# Patient Record
Sex: MALE | Race: OTHER | NOT HISPANIC OR LATINO | Employment: OTHER | URBAN - METROPOLITAN AREA
[De-identification: names, ages, dates, MRNs, and addresses within clinical notes are randomized per-mention and may not be internally consistent; named-entity substitution may affect disease eponyms.]

---

## 2022-05-02 ENCOUNTER — OFFICE VISIT (OUTPATIENT)
Dept: UROLOGY | Facility: CLINIC | Age: 67
End: 2022-05-02
Payer: MEDICARE

## 2022-05-02 VITALS
WEIGHT: 221.81 LBS | HEART RATE: 79 BPM | BODY MASS INDEX: 30.04 KG/M2 | HEIGHT: 72 IN | DIASTOLIC BLOOD PRESSURE: 79 MMHG | SYSTOLIC BLOOD PRESSURE: 127 MMHG

## 2022-05-02 DIAGNOSIS — N13.8 BPH WITH URINARY OBSTRUCTION: Primary | ICD-10-CM

## 2022-05-02 DIAGNOSIS — N40.1 BPH WITH URINARY OBSTRUCTION: Primary | ICD-10-CM

## 2022-05-02 PROCEDURE — 3288F FALL RISK ASSESSMENT DOCD: CPT | Mod: CPTII,S$GLB,, | Performed by: UROLOGY

## 2022-05-02 PROCEDURE — 1126F AMNT PAIN NOTED NONE PRSNT: CPT | Mod: CPTII,S$GLB,, | Performed by: UROLOGY

## 2022-05-02 PROCEDURE — 1126F PR PAIN SEVERITY QUANTIFIED, NO PAIN PRESENT: ICD-10-PCS | Mod: CPTII,S$GLB,, | Performed by: UROLOGY

## 2022-05-02 PROCEDURE — 3008F PR BODY MASS INDEX (BMI) DOCUMENTED: ICD-10-PCS | Mod: CPTII,S$GLB,, | Performed by: UROLOGY

## 2022-05-02 PROCEDURE — 3078F PR MOST RECENT DIASTOLIC BLOOD PRESSURE < 80 MM HG: ICD-10-PCS | Mod: CPTII,S$GLB,, | Performed by: UROLOGY

## 2022-05-02 PROCEDURE — 3074F PR MOST RECENT SYSTOLIC BLOOD PRESSURE < 130 MM HG: ICD-10-PCS | Mod: CPTII,S$GLB,, | Performed by: UROLOGY

## 2022-05-02 PROCEDURE — 99999 PR PBB SHADOW E&M-NEW PATIENT-LVL III: ICD-10-PCS | Mod: PBBFAC,,, | Performed by: UROLOGY

## 2022-05-02 PROCEDURE — 99204 OFFICE O/P NEW MOD 45 MIN: CPT | Mod: S$GLB,,, | Performed by: UROLOGY

## 2022-05-02 PROCEDURE — 1101F PT FALLS ASSESS-DOCD LE1/YR: CPT | Mod: CPTII,S$GLB,, | Performed by: UROLOGY

## 2022-05-02 PROCEDURE — 1160F RVW MEDS BY RX/DR IN RCRD: CPT | Mod: CPTII,S$GLB,, | Performed by: UROLOGY

## 2022-05-02 PROCEDURE — 1101F PR PT FALLS ASSESS DOC 0-1 FALLS W/OUT INJ PAST YR: ICD-10-PCS | Mod: CPTII,S$GLB,, | Performed by: UROLOGY

## 2022-05-02 PROCEDURE — 3008F BODY MASS INDEX DOCD: CPT | Mod: CPTII,S$GLB,, | Performed by: UROLOGY

## 2022-05-02 PROCEDURE — 3078F DIAST BP <80 MM HG: CPT | Mod: CPTII,S$GLB,, | Performed by: UROLOGY

## 2022-05-02 PROCEDURE — 99204 PR OFFICE/OUTPT VISIT, NEW, LEVL IV, 45-59 MIN: ICD-10-PCS | Mod: S$GLB,,, | Performed by: UROLOGY

## 2022-05-02 PROCEDURE — 1159F MED LIST DOCD IN RCRD: CPT | Mod: CPTII,S$GLB,, | Performed by: UROLOGY

## 2022-05-02 PROCEDURE — 3074F SYST BP LT 130 MM HG: CPT | Mod: CPTII,S$GLB,, | Performed by: UROLOGY

## 2022-05-02 PROCEDURE — 1159F PR MEDICATION LIST DOCUMENTED IN MEDICAL RECORD: ICD-10-PCS | Mod: CPTII,S$GLB,, | Performed by: UROLOGY

## 2022-05-02 PROCEDURE — 1160F PR REVIEW ALL MEDS BY PRESCRIBER/CLIN PHARMACIST DOCUMENTED: ICD-10-PCS | Mod: CPTII,S$GLB,, | Performed by: UROLOGY

## 2022-05-02 PROCEDURE — 3288F PR FALLS RISK ASSESSMENT DOCUMENTED: ICD-10-PCS | Mod: CPTII,S$GLB,, | Performed by: UROLOGY

## 2022-05-02 PROCEDURE — 99999 PR PBB SHADOW E&M-NEW PATIENT-LVL III: CPT | Mod: PBBFAC,,, | Performed by: UROLOGY

## 2022-05-02 RX ORDER — LIDOCAINE HYDROCHLORIDE 20 MG/ML
JELLY TOPICAL ONCE
Status: CANCELLED | OUTPATIENT
Start: 2022-05-02 | End: 2022-05-02

## 2022-05-02 RX ORDER — ROSUVASTATIN CALCIUM 5 MG/1
5 TABLET, COATED ORAL
COMMUNITY

## 2022-05-02 RX ORDER — MULTIVITAMIN
1 TABLET ORAL DAILY
COMMUNITY

## 2022-05-02 RX ORDER — DOXAZOSIN 8 MG/1
4 TABLET ORAL
COMMUNITY

## 2022-05-02 RX ORDER — TAMSULOSIN HYDROCHLORIDE 0.4 MG/1
1 CAPSULE ORAL DAILY
COMMUNITY
Start: 2021-05-27 | End: 2024-01-23

## 2022-05-02 RX ORDER — BENAZEPRIL HYDROCHLORIDE AND HYDROCHLOROTHIAZIDE 20; 12.5 MG/1; MG/1
0.5 TABLET ORAL
COMMUNITY

## 2022-05-02 NOTE — PROGRESS NOTES
CHIEF COMPLAINT:    Mr. Edmonds is a 66 y.o. male presenting with LUTS.    PRESENTING ILLNESS:    Ahmet Edmonds is a 66 y.o. male who c/o LUTS.  He is friends with Socorro and Abigail.  He has a decreased FOS, + straining, + hesitancy, + feeling of incomplete emptying.  He's on flomax BID. Was on Avodart which improved his symptoms, but he stopped it due to sexual side effects.    He's on TRT managed by a Dr in [place].  He has ED and gets good results with Viagra and Cialis.    REVIEW OF SYSTEMS:    Ahmet Edmonds denies headache, blurred vision, fever, nausea, vomiting, chills, abdominal pain, chest pain, sore throat, bleeding per rectum, cough, SOB, recent loss of consciousness, recent mental status changes, seizures, dizziness, or upper or lower extremity weakness.    LINDA  1. 3  2. 4  3. 4  4. 4  5. 4      PATIENT HISTORY:    Past Medical History:   Diagnosis Date    Hypertension        Past Surgical History:   Procedure Laterality Date    KNEE ARTHROSCOPY Left        Family History   Problem Relation Age of Onset    Heart disease Father     Hypertension Father        Social History     Socioeconomic History    Marital status: Single   Tobacco Use    Smoking status: Never Smoker    Smokeless tobacco: Never Used   Substance and Sexual Activity    Alcohol use: Yes    Drug use: Never    Sexual activity: Yes     Partners: Female       Allergies:  Patient has no known allergies.    Medications:    Current Outpatient Medications:     benazepril-hydrochlorthiazide (LOTENSIN HCT) 20-12.5 mg per tablet, Take 0.5 tablets by mouth., Disp: , Rfl:     doxazosin (CARDURA) 8 MG Tab, Take 4 mg by mouth., Disp: , Rfl:     multivitamin (THERAGRAN) per tablet, Take 1 tablet by mouth once daily., Disp: , Rfl:     rosuvastatin (CRESTOR) 5 MG tablet, Take 5 mg by mouth., Disp: , Rfl:     tamsulosin (FLOMAX) 0.4 mg Cap, Take 1 capsule by mouth once daily., Disp: , Rfl:     PHYSICAL EXAMINATION:    The patient  generally appears in good health, is appropriately interactive, and is in no apparent distress.     Eyes: anicteric sclerae, moist conjunctivae; no lid-lag; PERRLA     HENT: Atraumatic; oropharynx clear with moist mucous membranes and no mucosal ulcerations;normal hard and soft palate.  No evidence of lymphadenopathy.    Neck: Trachea midline.  No thyromegaly.    Skin: No lesions.    Mental: Cooperative with normal affect.  Is oriented to time, place, and person.    Neuro: Grossly intact.    Chest: Normal inspiratory effort.   No accessory muscles.  No audible wheezes.  Respirations symmetric on inspiration and expiration.    Heart: Regular rhythm.      Abdomen:  Soft, non-tender. No masses or organomegaly. Bladder is not palpable. No evidence of flank discomfort. No evidence of inguinal hernia.    Genitourinary: The penis is circumcised with no evidence of plaques or induration. The urethral meatus is normal. The testes, epididymides, and cord structures are normal in size and contour bilaterally. The scrotum is normal in size and contour.    Normal anal sphincter tone. No rectal mass.    The prostate is 50 g. Normal landmarks. Lateral sulci. Median furrow intact.  No nodularity or induration. Seminal vesicles are normal.    Extremities: No clubbing, cyanosis, or edema      LABS:    UA dipped negative today  PVR done immediately after voiding by my nurse is 230 cc.     PSA 12/2021--2.5  No results found for: PSA, PSADIAG, PSATOTAL, PSAFREE, PSAFREEPCT    IMPRESSION:    Encounter Diagnoses   Name Primary?    BPH with urinary obstruction Yes         PLAN:    1. Discussed options for his LUTS.  He'd like rezum.  2. Can continue PCa screening in [place].  3. Continue Viagra or Cialis for ED.  4. Discussed risks/benefits of Rezum.  Discussed bleeding, frequency, failure amongst other risks.  Also discussed very small risk of EjD and ED.  He was given the chance to ask questions.  Alternatives discussed.  Will  schedule for Rezum.  5. Will cysto to make sure he's a candidate.    Copy to:

## 2022-05-04 ENCOUNTER — TELEPHONE (OUTPATIENT)
Dept: UROLOGY | Facility: CLINIC | Age: 67
End: 2022-05-04
Payer: MEDICARE

## 2022-05-04 DIAGNOSIS — N13.8 BPH WITH URINARY OBSTRUCTION: Primary | ICD-10-CM

## 2022-05-04 DIAGNOSIS — N40.1 BPH WITH URINARY OBSTRUCTION: Primary | ICD-10-CM

## 2022-05-06 ENCOUNTER — PROCEDURE VISIT (OUTPATIENT)
Dept: UROLOGY | Facility: CLINIC | Age: 67
End: 2022-05-06
Payer: MEDICARE

## 2022-05-06 VITALS
SYSTOLIC BLOOD PRESSURE: 143 MMHG | HEART RATE: 74 BPM | TEMPERATURE: 99 F | WEIGHT: 226.5 LBS | RESPIRATION RATE: 17 BRPM | HEIGHT: 72 IN | DIASTOLIC BLOOD PRESSURE: 79 MMHG | BODY MASS INDEX: 30.68 KG/M2

## 2022-05-06 DIAGNOSIS — N40.1 BPH WITH URINARY OBSTRUCTION: ICD-10-CM

## 2022-05-06 DIAGNOSIS — N13.8 BPH WITH URINARY OBSTRUCTION: ICD-10-CM

## 2022-05-06 PROCEDURE — 52000 CYSTOURETHROSCOPY: CPT | Mod: S$GLB,,, | Performed by: UROLOGY

## 2022-05-06 PROCEDURE — 52000 PR CYSTOURETHROSCOPY: ICD-10-PCS | Mod: S$GLB,,, | Performed by: UROLOGY

## 2022-05-06 RX ORDER — LIDOCAINE HYDROCHLORIDE 20 MG/ML
JELLY TOPICAL ONCE
Status: COMPLETED | OUTPATIENT
Start: 2022-05-06 | End: 2022-05-06

## 2022-05-06 RX ADMIN — LIDOCAINE HYDROCHLORIDE: 20 JELLY TOPICAL at 11:05

## 2022-05-06 NOTE — PROCEDURES
Procedures   Date: 05/06/2022     Pre procedure diagnosis:  Encounter Diagnosis   Name Primary?    BPH with urinary obstruction         Post procedure diagnosis:same    Surgeon: Cipriano    Assistant: Reggie    Procedure performed: cystoscopy    Blood loss: None    Specimen: None    Procedure in detail: Consent was obtained.  Using standard sterile technique, the flexible cystoscope was assembled and passed into the patient's bladder.  Cystoscopic evaluation of the bladder revealed no abnormalities.  The estimated prostatic length was 3.5 cm with a small median lobe.

## 2022-05-13 ENCOUNTER — TELEPHONE (OUTPATIENT)
Dept: UROLOGY | Facility: CLINIC | Age: 67
End: 2022-05-13
Payer: MEDICARE

## 2022-05-13 NOTE — TELEPHONE ENCOUNTER
Pt requesting call back re: pt developed a fever on Wednesday and it has been on/off since. Pt also states it hurts and burns when he urinates as well and is worried it may be side affects from his procedure on 5-6.   Returned patient's call states his temperature was 100.2 and once it was 100.8. no temp now , just having a little burning with urination, I instructed patient to increase water intake, he screamed  At me and said he will just go to his PCP to see if he have a UTI then he slammed the phone down .

## 2022-05-17 ENCOUNTER — TELEPHONE (OUTPATIENT)
Dept: UROLOGY | Facility: CLINIC | Age: 67
End: 2022-05-17
Payer: MEDICARE

## 2022-05-17 NOTE — TELEPHONE ENCOUNTER
----- Message from Flory Saba LPN sent at 5/16/2022  3:08 PM CDT -----  Contact: pt  June I spoke with this patient he want to cancel his surgery scheduled for the 31st  ----- Message -----  From: Merly Hawkins  Sent: 5/16/2022  11:13 AM CDT  To: Cipriano MILLER Staff    Pt calling in regards to cancelling procedure . Please call       Confirmed patient's contact info below:  Contact Name: Ahmet Edmonds  Phone Number: [phone]

## 2024-01-23 ENCOUNTER — OFFICE VISIT (OUTPATIENT)
Dept: INTERNAL MEDICINE | Facility: CLINIC | Age: 69
End: 2024-01-23
Payer: MEDICARE

## 2024-01-23 VITALS
HEART RATE: 83 BPM | BODY MASS INDEX: 27.41 KG/M2 | HEIGHT: 72 IN | OXYGEN SATURATION: 98 % | WEIGHT: 202.38 LBS | SYSTOLIC BLOOD PRESSURE: 122 MMHG | DIASTOLIC BLOOD PRESSURE: 80 MMHG

## 2024-01-23 DIAGNOSIS — N13.8 BPH WITH URINARY OBSTRUCTION: ICD-10-CM

## 2024-01-23 DIAGNOSIS — N40.1 BPH WITH URINARY OBSTRUCTION: ICD-10-CM

## 2024-01-23 DIAGNOSIS — I10 PRIMARY HYPERTENSION: ICD-10-CM

## 2024-01-23 DIAGNOSIS — Z00.00 ANNUAL PHYSICAL EXAM: ICD-10-CM

## 2024-01-23 DIAGNOSIS — S21.101A STERNAL WOUND INFECTION: ICD-10-CM

## 2024-01-23 DIAGNOSIS — I48.0 PAROXYSMAL ATRIAL FIBRILLATION: Primary | ICD-10-CM

## 2024-01-23 DIAGNOSIS — R79.89 LOW TESTOSTERONE IN MALE: ICD-10-CM

## 2024-01-23 DIAGNOSIS — L08.9 STERNAL WOUND INFECTION: ICD-10-CM

## 2024-01-23 DIAGNOSIS — Z95.1 HX OF CABG: ICD-10-CM

## 2024-01-23 PROCEDURE — 1125F AMNT PAIN NOTED PAIN PRSNT: CPT | Mod: CPTII,,, | Performed by: INTERNAL MEDICINE

## 2024-01-23 PROCEDURE — 3288F FALL RISK ASSESSMENT DOCD: CPT | Mod: CPTII,,, | Performed by: INTERNAL MEDICINE

## 2024-01-23 PROCEDURE — 3074F SYST BP LT 130 MM HG: CPT | Mod: CPTII,,, | Performed by: INTERNAL MEDICINE

## 2024-01-23 PROCEDURE — 3008F BODY MASS INDEX DOCD: CPT | Mod: CPTII,,, | Performed by: INTERNAL MEDICINE

## 2024-01-23 PROCEDURE — 99387 INIT PM E/M NEW PAT 65+ YRS: CPT | Mod: ,,, | Performed by: INTERNAL MEDICINE

## 2024-01-23 PROCEDURE — 1160F RVW MEDS BY RX/DR IN RCRD: CPT | Mod: CPTII,,, | Performed by: INTERNAL MEDICINE

## 2024-01-23 PROCEDURE — 3079F DIAST BP 80-89 MM HG: CPT | Mod: CPTII,,, | Performed by: INTERNAL MEDICINE

## 2024-01-23 PROCEDURE — 1159F MED LIST DOCD IN RCRD: CPT | Mod: CPTII,,, | Performed by: INTERNAL MEDICINE

## 2024-01-23 PROCEDURE — 1101F PT FALLS ASSESS-DOCD LE1/YR: CPT | Mod: CPTII,,, | Performed by: INTERNAL MEDICINE

## 2024-01-23 RX ORDER — CIPROFLOXACIN HYDROCHLORIDE 500 MG/1
500 TABLET, FILM COATED ORAL 2 TIMES DAILY
COMMUNITY
Start: 2023-12-12 | End: 2024-04-22

## 2024-01-23 NOTE — PROGRESS NOTES
Patient ID: Ahmet Edmonds is a 68 y.o. male.    Chief Complaint: Establish Care ( )      HPI:   Patient presents here today for above reason.       The patient's Health Maintenance was reviewed and the following appears to be due at this time:   Health Maintenance Due   Topic Date Due    Hepatitis C Screening  Never done    COVID-19 Vaccine (1) Never done    TETANUS VACCINE  Never done    Hemoglobin A1c (Diabetic Prevention Screening)  Never done    Shingles Vaccine (1 of 2) Never done    RSV Vaccine (Age 60+ and Pregnant patients) (1 - 1-dose 60+ series) Never done    Pneumococcal Vaccines (Age 65+) (1 - PCV) Never done    Influenza Vaccine (1) 09/01/2023        Past Medical History:  Past Medical History:   Diagnosis Date    Hypertension      Past Surgical History:   Procedure Laterality Date    KNEE ARTHROSCOPY Left      Review of patient's allergies indicates:  No Known Allergies  Current Outpatient Medications on File Prior to Visit   Medication Sig Dispense Refill    benazepril-hydrochlorthiazide (LOTENSIN HCT) 20-12.5 mg per tablet Take 0.5 tablets by mouth.      CIPRO 500 mg tablet Take 500 mg by mouth 2 (two) times a day.      multivitamin (THERAGRAN) per tablet Take 1 tablet by mouth once daily.      rosuvastatin (CRESTOR) 5 MG tablet Take 5 mg by mouth.      doxazosin (CARDURA) 8 MG Tab Take 4 mg by mouth.      [DISCONTINUED] tamsulosin (FLOMAX) 0.4 mg Cap Take 1 capsule by mouth once daily.       No current facility-administered medications on file prior to visit.     Social History     Socioeconomic History    Marital status: Single   Tobacco Use    Smoking status: Never    Smokeless tobacco: Never   Substance and Sexual Activity    Alcohol use: Yes    Drug use: Never    Sexual activity: Yes     Partners: Female     Family History   Problem Relation Age of Onset    Heart disease Father     Hypertension Father        ROS:   Review of Systems  Constitutional: No weight gain, No fever, No chills, No  fatigue.   Eyes: No blurring, No visual disturbances.   Ear/Nose/Mouth/Throat: No decreased hearing, No ear pain, No nasal congestion, No sore throat.   Respiratory: No shortness of breath, No cough, No wheezing.   Cardiovascular: No chest pain, No palpitations, No peripheral edema.   Gastrointestinal: No nausea, No vomiting, No diarrhea, No constipation, No abdominal pain.   Genitourinary: No dysuria, No hematuria.   Hematology/Lymphatics: No bruising tendency, No bleeding tendency, No swollen lymph glands.   Endocrine: No excessive thirst, No polyuria, No excessive hunger.   Musculoskeletal: No joint pain, No muscle pain, No decreased range of motion.   Integumentary: No rash, No pruritus.   Neurologic: No abnormal balance, No confusion, No headache.   Psychiatric: No anxiety, No depression, Not suicidal, No hallucinations.      Vitals/PE:   /80 (BP Location: Left arm, Patient Position: Sitting, BP Method: Medium (Manual))   Pulse 83   Ht 6' (1.829 m)   Wt 91.8 kg (202 lb 6.4 oz)   SpO2 98%   BMI 27.45 kg/m²   Physical Exam    General: Alert and oriented, No acute distress.   Eye: Normal conjunctiva without exudate.  HENMT: Normocephalic/AT, Normal hearing, Oral mucosa is moist and pink   Neck: No goiter visualized.   Respiratory: Lungs CTAB, Respirations are non-labored, Breath sounds are equal, Symmetrical chest wall expansion.  Cardiovascular: Normal rate, Regular rhythm, No murmur, No edema.   Gastrointestinal: Non-distended.   Genitourinary: Deferred.  Musculoskeletal: Normal ROM, Normal gait, No deformities or amputations.  Integumentary: Warm, Dry, Intact. No diaphoresis, or flushing.  Neurologic: No focal deficits, Cranial Nerves II-XII are grossly intact.   Psychiatric: Cooperative, Appropriate mood & affect, Normal judgment, Non-suicidal.    Assessment/Plan:   ..  Problem List Items Addressed This Visit          Cardiac/Vascular    Paroxysmal atrial fibrillation - Primary    Hx of CABG     Primary hypertension       Renal/    BPH with urinary obstruction       ID    Sternal wound infection       Endocrine    Low testosterone in male       Other    Annual physical exam      Recommendations:  Diet (healthy food choices, reduce portions and overall calorie intake)  Exercise 30-45 minutes at least 3x per week  Avoid excessive alcohol intake and tobacco use  Stay UTD with immunizations and preventative screenings   Yearly Labs     ..  All chart reviewed  ,   all Ok  ,  I recommend  DHEA  50 mg  po daily     Resume  testosterone    Wait for  Urologist recommendation     RTC  4 months     Ok with exercises  light     ..No orders of the defined types were placed in this encounter.    Down to 20 C-reactive protein from 19-10 patient on Cipro for another 6 weeks by Dr. Esteves   All chart review creatinine function is normal 0.86 liver function test is normal no anemia no leukemia       I have discontinued Laz Edmonds's tamsulosin. I am also having him maintain his benazepril-hydrochlorthiazide, doxazosin, multivitamin, rosuvastatin, and CIPRO.    No orders of the defined types were placed in this encounter.      Education and counseling done face to face regarding medical conditions and plan. Contact office if new symptoms develop. Should any symptoms ever significantly worsen seek emergency medical attention/go to ER. Follow up at least yearly for wellness or sooner PRN. Nurse to call patient with any results. The patient is receptive, expresses understanding and is agreeable to plan. All questions have been answered.    Follow up in about 4 months (around 5/23/2024).

## 2024-03-13 DIAGNOSIS — T81.89XA NON-HEALING SURGICAL WOUND: Primary | ICD-10-CM

## 2024-03-14 ENCOUNTER — OFFICE VISIT (OUTPATIENT)
Dept: URGENT CARE | Facility: CLINIC | Age: 69
End: 2024-03-14
Payer: MEDICARE

## 2024-03-14 VITALS
OXYGEN SATURATION: 98 % | TEMPERATURE: 98 F | HEART RATE: 80 BPM | HEIGHT: 72 IN | WEIGHT: 202 LBS | RESPIRATION RATE: 16 BRPM | SYSTOLIC BLOOD PRESSURE: 128 MMHG | DIASTOLIC BLOOD PRESSURE: 77 MMHG | BODY MASS INDEX: 27.36 KG/M2

## 2024-03-14 DIAGNOSIS — S21.109A WOUND OF STERNAL REGION: Primary | ICD-10-CM

## 2024-03-14 PROCEDURE — 87077 CULTURE AEROBIC IDENTIFY: CPT | Performed by: FAMILY MEDICINE

## 2024-03-14 PROCEDURE — 99214 OFFICE O/P EST MOD 30 MIN: CPT | Mod: ,,, | Performed by: FAMILY MEDICINE

## 2024-03-14 RX ORDER — MUPIROCIN 20 MG/G
OINTMENT TOPICAL DAILY
Qty: 15 G | Refills: 0 | Status: SHIPPED | OUTPATIENT
Start: 2024-03-14 | End: 2024-03-28

## 2024-03-14 NOTE — PROGRESS NOTES
Subjective:      Patient ID: Ahmet Edmonds is a 68 y.o. male.    Vitals:  height is 6' (1.829 m) and weight is 91.6 kg (202 lb). His temperature is 98 °F (36.7 °C). His blood pressure is 128/77 and his pulse is 80. His respiration is 16 and oxygen saturation is 98%.     Chief Complaint: Blister (Patient has a blister that ruptured near a scar from open heart surgery in October. Currently on Cipro. )    Hx of osteomyelitis on 30 + days of cipro.  Here for concern of blister like lesion to the sternum at the surgical site.  Post CABG 5 months ago with complications.   CT scan chest about 1.5 months ago report : Redemonstrated small amount of pericardial fluid tracking to the retrosternal space., slightly decreased from prior exam. Also pt had a wound vac on the area post op.          Constitution: Negative for fatigue and fever.   Cardiovascular:  Negative for chest pain and palpitations.   Skin:  Positive for wound.   Neurological:  Negative for dizziness.      Objective:     Physical Exam   Cardiovascular: Normal rate.   Pulmonary/Chest: Effort normal.   Abdominal: Normal appearance.   Neurological: no focal deficit. He is alert.   Skin:         Comments: Pos swelling to R chest just inferior to the breast area.  Wound approx 1/2x1/2 cm bloody and clear discharge to the L of the sternal surgical wound.    Nursing note and vitals reviewed.      Assessment:     1. Wound of sternal region        Plan:       Wound of sternal region  -     Wound Culture  -     US Breast Right Limited; Future; Expected date: 03/14/2024

## 2024-03-14 NOTE — PATIENT INSTRUCTIONS
Warm compress, wound culture sent, may take 3 days to finalize.  Continue Cipro.  We will also plan for ultrasound of the swelling of the right lower chest area.

## 2024-03-17 DIAGNOSIS — S21.109A WOUND OF STERNAL REGION: Primary | ICD-10-CM

## 2024-03-17 RX ORDER — GENTAMICIN SULFATE 1 MG/G
CREAM TOPICAL 3 TIMES DAILY
Qty: 15 G | Refills: 0 | Status: SHIPPED | OUTPATIENT
Start: 2024-03-17 | End: 2024-03-28

## 2024-03-18 DIAGNOSIS — L08.9 STERNAL WOUND INFECTION: Primary | ICD-10-CM

## 2024-03-18 DIAGNOSIS — S21.101A STERNAL WOUND INFECTION: Primary | ICD-10-CM

## 2024-03-21 LAB — BACTERIA WND CULT: ABNORMAL

## 2024-03-28 ENCOUNTER — OFFICE VISIT (OUTPATIENT)
Dept: INTERNAL MEDICINE | Facility: CLINIC | Age: 69
End: 2024-03-28
Payer: MEDICARE

## 2024-03-28 VITALS
HEIGHT: 72 IN | SYSTOLIC BLOOD PRESSURE: 132 MMHG | WEIGHT: 205 LBS | BODY MASS INDEX: 27.77 KG/M2 | OXYGEN SATURATION: 98 % | HEART RATE: 79 BPM | DIASTOLIC BLOOD PRESSURE: 80 MMHG

## 2024-03-28 DIAGNOSIS — Z09 HOSPITAL DISCHARGE FOLLOW-UP: ICD-10-CM

## 2024-03-28 DIAGNOSIS — L08.9 STERNAL WOUND INFECTION: ICD-10-CM

## 2024-03-28 DIAGNOSIS — S21.101A STERNAL WOUND INFECTION: ICD-10-CM

## 2024-03-28 DIAGNOSIS — Z95.1 HX OF CABG: Primary | ICD-10-CM

## 2024-03-28 PROCEDURE — 1125F AMNT PAIN NOTED PAIN PRSNT: CPT | Mod: CPTII,,, | Performed by: INTERNAL MEDICINE

## 2024-03-28 PROCEDURE — 4010F ACE/ARB THERAPY RXD/TAKEN: CPT | Mod: CPTII,,, | Performed by: INTERNAL MEDICINE

## 2024-03-28 PROCEDURE — 99496 TRANSJ CARE MGMT HIGH F2F 7D: CPT | Mod: ,,, | Performed by: INTERNAL MEDICINE

## 2024-03-28 PROCEDURE — 3288F FALL RISK ASSESSMENT DOCD: CPT | Mod: CPTII,,, | Performed by: INTERNAL MEDICINE

## 2024-03-28 PROCEDURE — 1101F PT FALLS ASSESS-DOCD LE1/YR: CPT | Mod: CPTII,,, | Performed by: INTERNAL MEDICINE

## 2024-03-28 PROCEDURE — 3075F SYST BP GE 130 - 139MM HG: CPT | Mod: CPTII,,, | Performed by: INTERNAL MEDICINE

## 2024-03-28 PROCEDURE — 1159F MED LIST DOCD IN RCRD: CPT | Mod: CPTII,,, | Performed by: INTERNAL MEDICINE

## 2024-03-28 PROCEDURE — 1160F RVW MEDS BY RX/DR IN RCRD: CPT | Mod: CPTII,,, | Performed by: INTERNAL MEDICINE

## 2024-03-28 PROCEDURE — 3079F DIAST BP 80-89 MM HG: CPT | Mod: CPTII,,, | Performed by: INTERNAL MEDICINE

## 2024-03-28 RX ORDER — METHOCARBAMOL 500 MG/1
500 TABLET, FILM COATED ORAL 3 TIMES DAILY
Qty: 30 TABLET | Refills: 0 | Status: SHIPPED | OUTPATIENT
Start: 2024-03-28 | End: 2024-04-07

## 2024-03-28 RX ORDER — BISOPROLOL FUMARATE 5 MG/1
2.5 TABLET, FILM COATED ORAL DAILY
COMMUNITY

## 2024-03-28 NOTE — PROGRESS NOTES
Patient ID: Ahmet Edmonds is a 68 y.o. male.  Chief Complaint: Follow-up (Hospital discharge sternal infection)    HPI:     69 yo male  hx sternal wound infection , after CABG oct 2023 , recently had a blister at sternal area , went to 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 No fever  no chills ,  here with many questions about findings  of renal cyst , and OA of spine .   Has picc line , no fever , no drainage , doing well .     Current Outpatient Medications:     benazepril-hydrochlorthiazide (LOTENSIN HCT) 20-12.5 mg per tablet, Take 0.5 tablets by mouth., Disp: , Rfl:     bisoprolol (ZEBETA) 5 MG tablet, Take 2.5 mg by mouth once daily., Disp: , Rfl:     rosuvastatin (CRESTOR) 5 MG tablet, Take 5 mg by mouth., Disp: , Rfl:     CIPRO 500 mg tablet, Take 500 mg by mouth 2 (two) times a day., Disp: , Rfl:     doxazosin (CARDURA) 8 MG Tab, Take 4 mg by mouth., Disp: , Rfl:     multivitamin (THERAGRAN) per tablet, Take 1 tablet by mouth once daily., Disp: , Rfl:   ROS:   Constitutional: No weight gain, No fever, No chills, No fatigue.   Eyes: No blurring, No visual disturbances.   Ear/Nose/Mouth/Throat: No decreased hearing, No ear pain, No nasal congestion, No sore throat.   Respiratory: No shortness of breath, No cough, No wheezing.   Cardiovascular: No chest pain, No palpitations, No peripheral edema.   Gastrointestinal: No nausea, No vomiting, No diarrhea, No constipation, No abdominal pain.   Genitourinary: No dysuria, No hematuria.   Hematology/Lymphatics: No bruising tendency, No bleeding tendency, No swollen lymph glands.   Endocrine: No excessive thirst, No polyuria, No excessive hunger.   Musculoskeletal: No joint pain, No muscle pain, No decreased range of motion.   Integumentary: No rash, No pruritus.  Wound vac in place  no drainage   Neurologic: No abnormal balance, No confusion, No headache.   Psychiatric: No anxiety, No  depression, Not suicidal, No hallucinations.    PE/Vitals:   /80 (BP Location: Left arm, Patient Position: Sitting, BP Method: Medium (Manual))   Pulse 79   Ht 6' (1.829 m)   Wt 93 kg (205 lb)   SpO2 98%   BMI 27.80 kg/m²   General: Alert and oriented, No acute distress.   Eye: Normal conjunctiva without exudate.  HENMT: Normocephalic/AT, Normal hearing, Oral mucosa is moist and pink   Neck: No goiter visualized.   Respiratory: Lungs CTAB, Respirations are non-labored, Breath sounds are equal, Symmetrical chest wall expansion.  Cardiovascular: Normal rate, Regular rhythm, No murmur, No edema.   Gastrointestinal: Non-distended.   Genitourinary: Deferred.  Musculoskeletal: Normal ROM, Normal gait, No deformities or amputations.  Integumentary: Warm, Dry, Intact. No diaphoresis, or flushing. Sternal  vac in place  no erythema  no drainage  ,   Neurologic: No focal deficits, Cranial Nerves II-XII are grossly intact.   Psychiatric: Cooperative, Appropriate mood & affect, Normal judgment, Non-suicidal.  Assessment/Plan   1. Hx of CABG  Comments:  hardware was removed  and  Abx    2. Hospital discharge follow-up  Comments:  chart  reviewed  culture  Klebsiella  aerogenes  multiple drug resistant  on Merrem    3. Sternal wound infection  Comments:  on Abx , has aappoint Infectious Disease  tuesday   i will recommend  HBO      No orders of the defined types were placed in this encounter.    Education and counseling done face to face regarding medical conditions and plan. Contact office if new symptoms develop. Should any symptoms ever significantly worsen seek emergency medical attention/go to ER. Follow up at least yearly for wellness or sooner PRN. Nurse to call patient with any results. The patient is receptive, expresses understanding and is agreeable to plan. All questions have been answered.  Follow up in about 4 months (around 7/28/2024).

## 2024-04-02 ENCOUNTER — OFFICE VISIT (OUTPATIENT)
Dept: INFECTIOUS DISEASES | Facility: CLINIC | Age: 69
End: 2024-04-02
Payer: MEDICARE

## 2024-04-02 VITALS
OXYGEN SATURATION: 99 % | WEIGHT: 204 LBS | BODY MASS INDEX: 27.04 KG/M2 | RESPIRATION RATE: 20 BRPM | HEIGHT: 73 IN | HEART RATE: 87 BPM | SYSTOLIC BLOOD PRESSURE: 130 MMHG | DIASTOLIC BLOOD PRESSURE: 60 MMHG

## 2024-04-02 DIAGNOSIS — L08.9 STERNAL WOUND INFECTION: ICD-10-CM

## 2024-04-02 DIAGNOSIS — M86.9 OSTEOMYELITIS OF OTHER SITE, UNSPECIFIED TYPE: ICD-10-CM

## 2024-04-02 DIAGNOSIS — S21.101A STERNAL WOUND INFECTION: ICD-10-CM

## 2024-04-02 DIAGNOSIS — Z95.1 HX OF CABG: Primary | ICD-10-CM

## 2024-04-02 DIAGNOSIS — A49.9 ESBL (EXTENDED SPECTRUM BETA-LACTAMASE) PRODUCING BACTERIA INFECTION: ICD-10-CM

## 2024-04-02 DIAGNOSIS — Z16.12 ESBL (EXTENDED SPECTRUM BETA-LACTAMASE) PRODUCING BACTERIA INFECTION: ICD-10-CM

## 2024-04-02 PROCEDURE — 4010F ACE/ARB THERAPY RXD/TAKEN: CPT | Mod: CPTII,S$GLB,, | Performed by: GENERAL PRACTICE

## 2024-04-02 PROCEDURE — 1101F PT FALLS ASSESS-DOCD LE1/YR: CPT | Mod: CPTII,S$GLB,, | Performed by: GENERAL PRACTICE

## 2024-04-02 PROCEDURE — 1159F MED LIST DOCD IN RCRD: CPT | Mod: CPTII,S$GLB,, | Performed by: GENERAL PRACTICE

## 2024-04-02 PROCEDURE — 99205 OFFICE O/P NEW HI 60 MIN: CPT | Mod: S$GLB,,, | Performed by: GENERAL PRACTICE

## 2024-04-02 PROCEDURE — 99999 PR PBB SHADOW E&M-EST. PATIENT-LVL IV: CPT | Mod: PBBFAC,,, | Performed by: GENERAL PRACTICE

## 2024-04-02 PROCEDURE — 3008F BODY MASS INDEX DOCD: CPT | Mod: CPTII,S$GLB,, | Performed by: GENERAL PRACTICE

## 2024-04-02 PROCEDURE — 3075F SYST BP GE 130 - 139MM HG: CPT | Mod: CPTII,S$GLB,, | Performed by: GENERAL PRACTICE

## 2024-04-02 PROCEDURE — 3078F DIAST BP <80 MM HG: CPT | Mod: CPTII,S$GLB,, | Performed by: GENERAL PRACTICE

## 2024-04-02 PROCEDURE — 3288F FALL RISK ASSESSMENT DOCD: CPT | Mod: CPTII,S$GLB,, | Performed by: GENERAL PRACTICE

## 2024-04-02 RX ORDER — NAPROXEN SODIUM 220 MG/1
1 TABLET, FILM COATED ORAL ONCE
COMMUNITY
Start: 2023-11-10

## 2024-04-02 RX ORDER — TRAMADOL HYDROCHLORIDE 50 MG/1
50 TABLET ORAL EVERY 6 HOURS PRN
COMMUNITY
Start: 2024-03-23
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
## 2024-04-04 ENCOUNTER — TELEPHONE (OUTPATIENT)
Dept: INFECTIOUS DISEASES | Facility: CLINIC | Age: 69
End: 2024-04-04
Payer: MEDICARE

## 2024-04-04 NOTE — TELEPHONE ENCOUNTER
Called patient back, had wound vac removed yesterday, but will kimi need to have it back on starting Monday to assist in wound healing. Discussed potential benefits of hyperbarics but deferred further referrals to wound care +/- hyperbarics to the patient's surgeon. Patient agreeable with the plan.      Du Flynn MD  Infectious Disease  [facility]

## 2024-04-04 NOTE — TELEPHONE ENCOUNTER
----- Message from Anju Jha MA sent at 4/3/2024  2:25 PM CDT -----  Regarding: FW: hyperbaric questions  Spoke with pt and he said that he has been reading up on Hyperbaric therapy and is wondering if this would be a consideration to treat and kill his current infection.  ----- Message -----  From: Ashlee Francois  Sent: 4/3/2024  11:31 AM CDT  To: Anju Jha MA  Subject: hyperbaric questions                             Pt ask for the nurse to call him, he wanted to ask about Hyperbaric Therapy, pt # [phone]

## 2024-04-15 ENCOUNTER — TELEPHONE (OUTPATIENT)
Dept: INFECTIOUS DISEASES | Facility: HOSPITAL | Age: 69
End: 2024-04-15
Payer: MEDICARE

## 2024-04-15 DIAGNOSIS — B02.8 HERPES ZOSTER WITH COMPLICATION: Primary | ICD-10-CM

## 2024-04-15 RX ORDER — VALACYCLOVIR HYDROCHLORIDE 1 G/1
1000 TABLET, FILM COATED ORAL 2 TIMES DAILY
Qty: 20 TABLET | Refills: 0 | Status: SHIPPED | OUTPATIENT
Start: 2024-04-15 | End: 2024-05-01

## 2024-04-15 NOTE — TELEPHONE ENCOUNTER
Patient called complaining of a rash on the left flank.  He reports that it loops around his love handle do not cross the midline, he also notes that he started itching in that area prior to the development of the rash.  He did e-mail us securely a picture of the rash.  It does appear to be vesicular, inflamed.  The patient does have follow-up appointment with me on 04/17/2024.  His description and the image provided, is highly suspicious of shingles.  Will therefore prescribe valacyclovir 1 g p.o. q.12 hours for 10 days.  I have advised the patient about the infective 80 of this lesions until they scab over.  Have also advised him to present to the emergency department should he develop any signs of CNS involvement or polytrauma [place] involvement.  He denies rash involving any other areas, unlikely to be reaction to antibiotics.  Patient in agreement with current plan.    Du Flynn MD  Infectious Disease  [facility]

## 2024-04-17 ENCOUNTER — OFFICE VISIT (OUTPATIENT)
Dept: INFECTIOUS DISEASES | Facility: CLINIC | Age: 69
End: 2024-04-17
Payer: MEDICARE

## 2024-04-17 VITALS
BODY MASS INDEX: 27.57 KG/M2 | OXYGEN SATURATION: 98 % | RESPIRATION RATE: 20 BRPM | DIASTOLIC BLOOD PRESSURE: 75 MMHG | HEART RATE: 89 BPM | WEIGHT: 208 LBS | SYSTOLIC BLOOD PRESSURE: 119 MMHG | HEIGHT: 73 IN

## 2024-04-17 DIAGNOSIS — B02.9 HERPES ZOSTER WITHOUT COMPLICATION: ICD-10-CM

## 2024-04-17 DIAGNOSIS — M86.9 OSTEOMYELITIS OF OTHER SITE, UNSPECIFIED TYPE: Primary | ICD-10-CM

## 2024-04-17 DIAGNOSIS — S21.101A STERNAL WOUND INFECTION: ICD-10-CM

## 2024-04-17 DIAGNOSIS — L08.9 STERNAL WOUND INFECTION: ICD-10-CM

## 2024-04-17 DIAGNOSIS — M79.2 NEURALGIA: ICD-10-CM

## 2024-04-17 PROCEDURE — 3008F BODY MASS INDEX DOCD: CPT | Mod: CPTII,S$GLB,, | Performed by: GENERAL PRACTICE

## 2024-04-17 PROCEDURE — 4010F ACE/ARB THERAPY RXD/TAKEN: CPT | Mod: CPTII,S$GLB,, | Performed by: GENERAL PRACTICE

## 2024-04-17 PROCEDURE — 1125F AMNT PAIN NOTED PAIN PRSNT: CPT | Mod: CPTII,S$GLB,, | Performed by: GENERAL PRACTICE

## 2024-04-17 PROCEDURE — 3074F SYST BP LT 130 MM HG: CPT | Mod: CPTII,S$GLB,, | Performed by: GENERAL PRACTICE

## 2024-04-17 PROCEDURE — 3078F DIAST BP <80 MM HG: CPT | Mod: CPTII,S$GLB,, | Performed by: GENERAL PRACTICE

## 2024-04-17 PROCEDURE — 99214 OFFICE O/P EST MOD 30 MIN: CPT | Mod: S$GLB,,, | Performed by: GENERAL PRACTICE

## 2024-04-17 PROCEDURE — 99999 PR PBB SHADOW E&M-EST. PATIENT-LVL III: CPT | Mod: PBBFAC,,, | Performed by: GENERAL PRACTICE

## 2024-04-17 PROCEDURE — 1101F PT FALLS ASSESS-DOCD LE1/YR: CPT | Mod: CPTII,S$GLB,, | Performed by: GENERAL PRACTICE

## 2024-04-17 PROCEDURE — 3288F FALL RISK ASSESSMENT DOCD: CPT | Mod: CPTII,S$GLB,, | Performed by: GENERAL PRACTICE

## 2024-04-17 RX ORDER — ERTAPENEM 1 G/1
1 INJECTION, POWDER, LYOPHILIZED, FOR SOLUTION INTRAMUSCULAR; INTRAVENOUS
COMMUNITY
Start: 2024-04-16 | End: 2024-05-01
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
## 2024-04-22 ENCOUNTER — OFFICE VISIT (OUTPATIENT)
Dept: INTERNAL MEDICINE | Facility: CLINIC | Age: 69
End: 2024-04-22
Payer: MEDICARE

## 2024-04-22 VITALS
BODY MASS INDEX: 27.47 KG/M2 | OXYGEN SATURATION: 97 % | SYSTOLIC BLOOD PRESSURE: 104 MMHG | DIASTOLIC BLOOD PRESSURE: 62 MMHG | WEIGHT: 207.31 LBS | HEART RATE: 76 BPM | HEIGHT: 73 IN | TEMPERATURE: 98 F

## 2024-04-22 DIAGNOSIS — R19.00 ABDOMINAL WALL BULGE: Primary | ICD-10-CM

## 2024-04-22 DIAGNOSIS — B02.7 DISSEMINATED HERPES ZOSTER: ICD-10-CM

## 2024-04-22 DIAGNOSIS — M79.2 NEURALGIA: ICD-10-CM

## 2024-04-22 PROCEDURE — 3008F BODY MASS INDEX DOCD: CPT | Mod: CPTII,,,

## 2024-04-22 PROCEDURE — 3078F DIAST BP <80 MM HG: CPT | Mod: CPTII,,,

## 2024-04-22 PROCEDURE — 3074F SYST BP LT 130 MM HG: CPT | Mod: CPTII,,,

## 2024-04-22 PROCEDURE — 3288F FALL RISK ASSESSMENT DOCD: CPT | Mod: CPTII,,,

## 2024-04-22 PROCEDURE — 99214 OFFICE O/P EST MOD 30 MIN: CPT | Mod: ,,,

## 2024-04-22 PROCEDURE — 1101F PT FALLS ASSESS-DOCD LE1/YR: CPT | Mod: CPTII,,,

## 2024-04-22 PROCEDURE — 4010F ACE/ARB THERAPY RXD/TAKEN: CPT | Mod: CPTII,,,

## 2024-04-22 PROCEDURE — 1160F RVW MEDS BY RX/DR IN RCRD: CPT | Mod: CPTII,,,

## 2024-04-22 PROCEDURE — 1159F MED LIST DOCD IN RCRD: CPT | Mod: CPTII,,,

## 2024-04-22 PROCEDURE — 1125F AMNT PAIN NOTED PAIN PRSNT: CPT | Mod: CPTII,,,

## 2024-04-22 RX ORDER — GABAPENTIN 100 MG/1
100 CAPSULE ORAL 3 TIMES DAILY
Qty: 90 CAPSULE | Refills: 3 | Status: SHIPPED | OUTPATIENT
Start: 2024-04-22 | End: 2024-08-20

## 2024-04-22 RX ORDER — ALFUZOSIN HYDROCHLORIDE 10 MG/1
10 TABLET, EXTENDED RELEASE ORAL
COMMUNITY

## 2024-04-22 NOTE — PROGRESS NOTES
"    Patient ID: Ahmet Edmonds is a 68 y.o. male.    Chief Complaint: swelling on L side of torso    Ahmet Edmonds is a 68 y.o. male, known to Dr De La Fuente, is here today for a requested visit.  Medical comorbidities include Afib, HTN, HLD, BPH, GERD, CAD CABG in October 2023 with postop sternal infection with multidrug-resistant Klebsiella s/p I&D and sternal hardware removal.  Followed by CT surgery and Infectious Disease currently on Meropenem for 6-8 weeks via PICC line.  Today presents with concerns over a left-sided lower abdominal bulge.  Is somewhat painful to touch, however does have overlying shingles rash.  No nausea, vomiting, change in bowels.  Denies excessive straining or prior history of hernia.  Does have some exacerbated neuralgia for shingles.  Did complete valacyclovir by Infectious Disease.  Otherwise stable for today's visit        MEDICAL HISTORY:    Past Medical History:   Diagnosis Date    Hypertension       Past Surgical History:   Procedure Laterality Date    CARDIAC SURGERY      KNEE ARTHROSCOPY Left       Social History     Tobacco Use    Smoking status: Never    Smokeless tobacco: Never   Substance Use Topics    Alcohol use: Yes    Drug use: Never          Health Maintenance Due   Topic Date Due    Hepatitis C Screening  Never done    TETANUS VACCINE  Never done    Hemoglobin A1c (Diabetic Prevention Screening)  Never done    Shingles Vaccine (1 of 2) Never done    RSV Vaccine (Age 60+ and Pregnant patients) (1 - 1-dose 60+ series) Never done    Pneumococcal Vaccines (Age 65+) (1 of 1 - PCV) Never done    COVID-19 Vaccine (1 - 2023-24 season) Never done          Patient Care Team:  Houston De La Fuente MD as PCP - General (Internal Medicine)      Review of Systems    Objective:   /62 (BP Location: Left arm, Patient Position: Sitting, BP Method: Large (Manual))   Pulse 76   Temp 97.7 °F (36.5 °C) (Temporal)   Ht 6' 0.99" (1.854 m)   Wt 94 kg (207 lb 4.8 oz)   SpO2 97%   BMI 27.36 " kg/m²      Physical Exam      Assessment:       ICD-10-CM ICD-9-CM   1. Abdominal wall bulge  R19.00 789.30   2. Disseminated herpes zoster  B02.7 053.79   3. Neuralgia  M79.2 729.2        Plan:     Problem List Items Addressed This Visit          Neuro    Neuralgia     -s/p shingles infection  -initiate gabapentin for neuralgia         Relevant Medications    gabapentin (NEURONTIN) 100 MG capsule       ID    Disseminated herpes zoster     -followed by Infectious Disease   -completed valacyclovir   -initiate gabapentin for neuralgia         Relevant Medications    gabapentin (NEURONTIN) 100 MG capsule       GI    Abdominal wall bulge - Primary     -acute  -discussed obtaining CT abdomen pelvis rule out hernia, patient declined due to financial circumstances   -2nd option abdominal ultrasound discussed, patient agreeable  -patient understanding potentially may not obtain answers with ultrasound alone         Relevant Medications    gabapentin (NEURONTIN) 100 MG capsule    Other Relevant Orders    US Abdomen Complete (Completed)          No follow-ups on file.   -plan specifics discussed above    Orders Placed This Encounter    US Abdomen Complete    gabapentin (NEURONTIN) 100 MG capsule        Medication List with Changes/Refills   New Medications    GABAPENTIN (NEURONTIN) 100 MG CAPSULE    Take 1 capsule (100 mg total) by mouth 3 (three) times daily.   Current Medications    ALFUZOSIN (UROXATRAL) 10 MG TB24    Take 10 mg by mouth daily with breakfast.    ASPIRIN 81 MG CHEW    1 tablet once.    BENAZEPRIL-HYDROCHLORTHIAZIDE (LOTENSIN HCT) 20-12.5 MG PER TABLET    Take 0.5 tablets by mouth.    BISOPROLOL (ZEBETA) 5 MG TABLET    Take 2.5 mg by mouth once daily.    ERTAPENEM (INVANZ) 1 GRAM INJECTION    Inject 1 g into the muscle.    MULTIVITAMIN (THERAGRAN) PER TABLET    Take 1 tablet by mouth once daily.    RANITIDINE (ZANTAC) 75 MG TABLET    Take 150 mg by mouth daily as needed.    ROSUVASTATIN (CRESTOR) 5 MG TABLET     Take 5 mg by mouth.    TRAMADOL (ULTRAM) 50 MG TABLET    Take 50 mg by mouth every 6 (six) hours as needed.    VALACYCLOVIR (VALTREX) 1000 MG TABLET    Take 1 tablet (1,000 mg total) by mouth 2 (two) times daily. for 10 days   Discontinued Medications    CIPRO 500 MG TABLET    Take 500 mg by mouth 2 (two) times a day.    DOXAZOSIN (CARDURA) 8 MG TAB    Take 4 mg by mouth.

## 2024-04-24 PROBLEM — R19.00 ABDOMINAL WALL BULGE: Status: ACTIVE | Noted: 2024-04-24

## 2024-04-24 PROBLEM — M79.2 NEURALGIA: Status: ACTIVE | Noted: 2024-04-24

## 2024-04-24 PROBLEM — B02.7 DISSEMINATED HERPES ZOSTER: Status: ACTIVE | Noted: 2024-04-24

## 2024-04-24 NOTE — ASSESSMENT & PLAN NOTE
-acute  -discussed obtaining CT abdomen pelvis rule out hernia, patient declined due to financial circumstances   -2nd option abdominal ultrasound discussed, patient agreeable  -patient understanding potentially may not obtain answers with ultrasound alone

## 2024-04-25 ENCOUNTER — TELEPHONE (OUTPATIENT)
Dept: INTERNAL MEDICINE | Facility: CLINIC | Age: 69
End: 2024-04-25
Payer: MEDICARE

## 2024-04-25 NOTE — TELEPHONE ENCOUNTER
----- Message from Mee Nixon sent at 4/25/2024 10:27 AM CDT -----  Regarding: Patient Call  .Type:  Patient Returning Call    Who Called:pt  Who Left Message for Patient:office staff  Does the patient know what this is regarding?:test results   Would the patient rather a call back or a response via [facility]?   Best Call Back Number:[phone]  Additional Information: pt states he was seen on on Monday and haven't heard from the office about his test results, please call

## 2024-04-25 NOTE — TELEPHONE ENCOUNTER
Spoke with patient and he was given results and recommendations to follow up with Dr De La Fuente if still having problems. Verbalized understanding.

## 2024-04-29 PROBLEM — Z00.00 ANNUAL PHYSICAL EXAM: Status: RESOLVED | Noted: 2024-01-23 | Resolved: 2024-04-29

## 2024-05-01 ENCOUNTER — OFFICE VISIT (OUTPATIENT)
Dept: INTERNAL MEDICINE | Facility: CLINIC | Age: 69
End: 2024-05-01
Payer: MEDICARE

## 2024-05-01 ENCOUNTER — OFFICE VISIT (OUTPATIENT)
Dept: INFECTIOUS DISEASES | Facility: CLINIC | Age: 69
End: 2024-05-01
Payer: MEDICARE

## 2024-05-01 VITALS
HEART RATE: 83 BPM | HEIGHT: 72 IN | OXYGEN SATURATION: 98 % | DIASTOLIC BLOOD PRESSURE: 78 MMHG | SYSTOLIC BLOOD PRESSURE: 122 MMHG | BODY MASS INDEX: 28.17 KG/M2 | WEIGHT: 208 LBS

## 2024-05-01 VITALS
RESPIRATION RATE: 20 BRPM | BODY MASS INDEX: 28.71 KG/M2 | HEART RATE: 76 BPM | WEIGHT: 212 LBS | DIASTOLIC BLOOD PRESSURE: 76 MMHG | HEIGHT: 72 IN | SYSTOLIC BLOOD PRESSURE: 131 MMHG

## 2024-05-01 DIAGNOSIS — S21.101A STERNAL WOUND INFECTION: Primary | ICD-10-CM

## 2024-05-01 DIAGNOSIS — M79.2 NEURALGIA: ICD-10-CM

## 2024-05-01 DIAGNOSIS — I10 PRIMARY HYPERTENSION: ICD-10-CM

## 2024-05-01 DIAGNOSIS — L08.9 STERNAL WOUND INFECTION: Primary | ICD-10-CM

## 2024-05-01 DIAGNOSIS — R19.00 ABDOMINAL WALL BULGE: ICD-10-CM

## 2024-05-01 DIAGNOSIS — I48.0 PAROXYSMAL ATRIAL FIBRILLATION: ICD-10-CM

## 2024-05-01 DIAGNOSIS — L08.9 STERNAL WOUND INFECTION: ICD-10-CM

## 2024-05-01 DIAGNOSIS — S21.101A STERNAL WOUND INFECTION: ICD-10-CM

## 2024-05-01 DIAGNOSIS — M86.9 OSTEOMYELITIS OF OTHER SITE, UNSPECIFIED TYPE: ICD-10-CM

## 2024-05-01 DIAGNOSIS — K43.9 HERNIA OF ABDOMINAL WALL: ICD-10-CM

## 2024-05-01 DIAGNOSIS — B02.8 HERPES ZOSTER WITH COMPLICATION: Primary | ICD-10-CM

## 2024-05-01 PROBLEM — B02.9 HERPES ZOSTER WITHOUT COMPLICATION: Status: ACTIVE | Noted: 2024-04-24

## 2024-05-01 PROCEDURE — 1125F AMNT PAIN NOTED PAIN PRSNT: CPT | Mod: CPTII,S$GLB,, | Performed by: GENERAL PRACTICE

## 2024-05-01 PROCEDURE — 99214 OFFICE O/P EST MOD 30 MIN: CPT | Mod: S$GLB,,, | Performed by: GENERAL PRACTICE

## 2024-05-01 PROCEDURE — 3008F BODY MASS INDEX DOCD: CPT | Mod: CPTII,,, | Performed by: INTERNAL MEDICINE

## 2024-05-01 PROCEDURE — 4010F ACE/ARB THERAPY RXD/TAKEN: CPT | Mod: CPTII,,, | Performed by: INTERNAL MEDICINE

## 2024-05-01 PROCEDURE — 1159F MED LIST DOCD IN RCRD: CPT | Mod: CPTII,,, | Performed by: INTERNAL MEDICINE

## 2024-05-01 PROCEDURE — 1101F PT FALLS ASSESS-DOCD LE1/YR: CPT | Mod: CPTII,,, | Performed by: INTERNAL MEDICINE

## 2024-05-01 PROCEDURE — 99999 PR PBB SHADOW E&M-EST. PATIENT-LVL III: CPT | Mod: PBBFAC,,, | Performed by: GENERAL PRACTICE

## 2024-05-01 PROCEDURE — 3075F SYST BP GE 130 - 139MM HG: CPT | Mod: CPTII,S$GLB,, | Performed by: GENERAL PRACTICE

## 2024-05-01 PROCEDURE — 3288F FALL RISK ASSESSMENT DOCD: CPT | Mod: CPTII,,, | Performed by: INTERNAL MEDICINE

## 2024-05-01 PROCEDURE — 3288F FALL RISK ASSESSMENT DOCD: CPT | Mod: CPTII,S$GLB,, | Performed by: GENERAL PRACTICE

## 2024-05-01 PROCEDURE — 3074F SYST BP LT 130 MM HG: CPT | Mod: CPTII,,, | Performed by: INTERNAL MEDICINE

## 2024-05-01 PROCEDURE — 1125F AMNT PAIN NOTED PAIN PRSNT: CPT | Mod: CPTII,,, | Performed by: INTERNAL MEDICINE

## 2024-05-01 PROCEDURE — 1101F PT FALLS ASSESS-DOCD LE1/YR: CPT | Mod: CPTII,S$GLB,, | Performed by: GENERAL PRACTICE

## 2024-05-01 PROCEDURE — 3078F DIAST BP <80 MM HG: CPT | Mod: CPTII,,, | Performed by: INTERNAL MEDICINE

## 2024-05-01 PROCEDURE — 4010F ACE/ARB THERAPY RXD/TAKEN: CPT | Mod: CPTII,S$GLB,, | Performed by: GENERAL PRACTICE

## 2024-05-01 PROCEDURE — 1160F RVW MEDS BY RX/DR IN RCRD: CPT | Mod: CPTII,,, | Performed by: INTERNAL MEDICINE

## 2024-05-01 PROCEDURE — 3008F BODY MASS INDEX DOCD: CPT | Mod: CPTII,S$GLB,, | Performed by: GENERAL PRACTICE

## 2024-05-01 PROCEDURE — 3078F DIAST BP <80 MM HG: CPT | Mod: CPTII,S$GLB,, | Performed by: GENERAL PRACTICE

## 2024-05-01 PROCEDURE — 99214 OFFICE O/P EST MOD 30 MIN: CPT | Mod: ,,, | Performed by: INTERNAL MEDICINE

## 2024-05-01 RX ORDER — VALACYCLOVIR HYDROCHLORIDE 1 G/1
1000 TABLET, FILM COATED ORAL DAILY
Qty: 21 TABLET | Refills: 0 | Status: SHIPPED | OUTPATIENT
Start: 2024-05-01 | End: 2025-05-01

## 2024-05-01 RX ORDER — ACETAMINOPHEN AND CODEINE PHOSPHATE 300; 30 MG/1; MG/1
1 TABLET ORAL EVERY 6 HOURS PRN
Qty: 18 TABLET | Refills: 0 | Status: SHIPPED | OUTPATIENT
Start: 2024-05-01 | End: 2024-05-11

## 2024-05-01 NOTE — PROGRESS NOTES
Patient ID: Ahmet Edmonds is a 68 y.o. male.  Chief Complaint: Other Misc (Lump on left side/shingles)    HPI:         Current Outpatient Medications:     alfuzosin (UROXATRAL) 10 mg Tb24, Take 10 mg by mouth daily with breakfast., Disp: , Rfl:     aspirin 81 MG Chew, 1 tablet once., Disp: , Rfl:     benazepril-hydrochlorthiazide (LOTENSIN HCT) 20-12.5 mg per tablet, Take 0.5 tablets by mouth., Disp: , Rfl:     bisoprolol (ZEBETA) 5 MG tablet, Take 2.5 mg by mouth once daily., Disp: , Rfl:     gabapentin (NEURONTIN) 100 MG capsule, Take 1 capsule (100 mg total) by mouth 3 (three) times daily., Disp: 90 capsule, Rfl: 3    multivitamin (THERAGRAN) per tablet, Take 1 tablet by mouth once daily., Disp: , Rfl:     ranitidine (ZANTAC) 75 MG tablet, Take 150 mg by mouth daily as needed., Disp: , Rfl:     rosuvastatin (CRESTOR) 5 MG tablet, Take 5 mg by mouth., Disp: , Rfl:     traMADoL (ULTRAM) 50 mg tablet, Take 50 mg by mouth every 6 (six) hours as needed., Disp: , Rfl:   ROS:   Constitutional: No weight gain, No fever, No chills, No fatigue.   Eyes: No blurring, No visual disturbances.   Ear/Nose/Mouth/Throat: No decreased hearing, No ear pain, No nasal congestion, No sore throat.   Respiratory: No shortness of breath, No cough, No wheezing.   Cardiovascular: No chest pain, No palpitations, No peripheral edema.   Gastrointestinal: No nausea, No vomiting, No diarrhea, No constipation, No abdominal pain.   Genitourinary: No dysuria, No hematuria.   Hematology/Lymphatics: No bruising tendency, No bleeding tendency, No swollen lymph glands.   Endocrine: No excessive thirst, No polyuria, No excessive hunger.   Musculoskeletal: No joint pain, No muscle pain, No decreased range of motion.   Integumentary: No rash, No pruritus.   Neurologic: No abnormal balance, No confusion, No headache.   Psychiatric: No anxiety, No depression, Not suicidal, No hallucinations.    PE/Vitals:   /78 (BP Location: Left arm, Patient  Position: Sitting, BP Method: Medium (Manual))   Pulse 83   Ht 6' (1.829 m)   Wt 94.3 kg (208 lb)   SpO2 98%   BMI 28.21 kg/m²   General: Alert and oriented, No acute distress.   Eye: Normal conjunctiva without exudate.  HENMT: Normocephalic/AT, Normal hearing, Oral mucosa is moist and pink   Neck: No goiter visualized.   Respiratory: Lungs CTAB, Respirations are non-labored, Breath sounds are equal, Symmetrical chest wall expansion.  Cardiovascular: Normal rate, Regular rhythm, No murmur, No edema.   Gastrointestinal: Non-distended.   Genitourinary: Deferred.  Musculoskeletal: Normal ROM, Normal gait, No deformities or amputations.  Integumentary: Warm, Dry, Intact. No diaphoresis, or flushing.  Neurologic: No focal deficits, Cranial Nerves II-XII are grossly intact.   Psychiatric: Cooperative, Appropriate mood & affect, Normal judgment, Non-suicidal.  Assessment/Plan   1. Herpes zoster with complication  Comments:  valtrex 1000 q day x 21 days    2. Primary hypertension  Comments:  stable  on zebeta    3. Paroxysmal atrial fibrillation  Comments:  rate controllle d    4. Sternal wound infection  Comments:  resolved by 6 weeks    5. Abdominal wall bulge  Comments:  re read  ABD US      No orders of the defined types were placed in this encounter.    Education and counseling done face to face regarding medical conditions and plan. Contact office if new symptoms develop. Should any symptoms ever significantly worsen seek emergency medical attention/go to ER. Follow up at least yearly for wellness or sooner PRN. Nurse to call patient with any results. The patient is receptive, expresses understanding and is agreeable to plan. All questions have been answered.  No follow-ups on file.
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
## 2024-05-06 ENCOUNTER — TELEPHONE (OUTPATIENT)
Dept: INTERNAL MEDICINE | Facility: CLINIC | Age: 69
End: 2024-05-06
Payer: MEDICARE

## 2024-05-06 ENCOUNTER — TELEPHONE (OUTPATIENT)
Dept: INFECTIOUS DISEASES | Facility: CLINIC | Age: 69
End: 2024-05-06
Payer: MEDICARE

## 2024-05-06 NOTE — TELEPHONE ENCOUNTER
Yes ultrasound has been reviewed there is no evidence of hernia , the pain obviously is the pulse shingles pain should slowly get better

## 2024-05-06 NOTE — TELEPHONE ENCOUNTER
----- Message from Nishant Fairbanks sent at 5/6/2024  1:02 PM CDT -----  .Type:  Needs Medical Advice    Who Called: Christian  Symptoms (please be specific):    How long has patient had these symptoms:    Pharmacy name and phone #:    Would the patient rather a call back or a response via [facility]?   Best Call Back Number: [phone]  Additional Information: Patient requested to speak with the nurse re: questions he had recent visit.

## 2024-05-06 NOTE — TELEPHONE ENCOUNTER
SPOKE WITH LOR[facility]TA AND GAVE VERBAL TO D/C WEEKLY LABS PER DR. ORTIZ. SLSL  ----- Message from Du Ortiz MD sent at 5/6/2024  9:45 AM CDT -----  Regarding: RE: order  Yes, okay to discontinue weekly labs  ----- Message -----  From: Anju Jha MA  Sent: 5/6/2024   9:42 AM CDT  To: Du Ortiz MD  Subject: FW: order                                        Can we give verbal to d/c weekly labs on pt?  ----- Message -----  From: Ashlee Francois  Sent: 5/6/2024   9:06 AM CDT  To: Anju Jha MA  Subject: enoch Hussein with [facility] [phone]. need orders to d/c weekly labs, picc line removed 05/03/24

## 2024-05-06 NOTE — TELEPHONE ENCOUNTER
Spoke to patient, he asked if Dr. De La Fuente spoke to the radiologist about re reading the US because the swelling is not any better and the side still hurts--Pain meds not helping    He indicated additional imaging may be needed

## 2024-05-07 ENCOUNTER — DOCUMENT SCAN (OUTPATIENT)
Dept: HOME HEALTH SERVICES | Facility: HOSPITAL | Age: 69
End: 2024-05-07
Payer: MEDICARE

## 2024-05-07 NOTE — TELEPHONE ENCOUNTER
Patient indicated he is still experiencing swelling in the localized area and has concerns, no so much pain concerns but concerns with the swelling and wants something to be done

## 2024-05-08 DIAGNOSIS — R19.00 ABDOMINAL MASS, UNSPECIFIED ABDOMINAL LOCATION: Primary | ICD-10-CM

## 2024-05-08 NOTE — TELEPHONE ENCOUNTER
Tried to contact patient to make him aware of Dr. De La Fuente's recommendations    No Answer/Left VM     CT Abdomen Ordered with [facility]

## 2024-05-13 ENCOUNTER — TELEPHONE (OUTPATIENT)
Dept: INTERNAL MEDICINE | Facility: CLINIC | Age: 69
End: 2024-05-13
Payer: MEDICARE

## 2024-05-13 NOTE — TELEPHONE ENCOUNTER
All WNL ,   accidentally  they found  gallstones  , if asymptomatic  leave it alone ,  watch for  fatty grease meals  ,

## 2024-05-13 NOTE — TELEPHONE ENCOUNTER
----- Message from Maryan Alberts sent at 5/13/2024 11:26 AM CDT -----  Regarding: advice  Type:  Needs Medical Advice    Who Called: pt    Would the patient rather a call back or a response via [facility]? C/b  Best Call Back Number: [phone]    Additional Information: pt states CT orders were incorrect and wants a c/b to discuss

## 2024-05-13 NOTE — TELEPHONE ENCOUNTER
[facility] did not contact office indicated CT was ordered Incorrectly.    Results in system, Please Advise

## 2024-05-14 NOTE — TELEPHONE ENCOUNTER
Spoke to patient about results.    Patient is requesting a phone call from Dr. De La Fuente personally, wants to discuss what can be causing the abdominal swelling.  He has been made aware that Dr. De La Fuente is out of the office until next week, will send the message to see if he can give him a call once he returns.

## 2024-05-20 ENCOUNTER — TELEPHONE (OUTPATIENT)
Dept: INTERNAL MEDICINE | Facility: CLINIC | Age: 69
End: 2024-05-20
Payer: MEDICARE

## 2024-05-20 NOTE — TELEPHONE ENCOUNTER
----- Message from Carolyn Sam sent at 5/20/2024  3:43 PM CDT -----  Regarding: advice  Who Called: Ahmet Edmonds    Caller is requesting assistance/information from provider's office.    Symptoms (please be specific):      How long has patient had these symptoms:      List of preferred pharmacies on file (remove unneeded): [unfilled]  If different, enter pharmacy into here including location and phone number:         Preferred Method of Contact: Phone Call  Patient's Preferred Phone Number on File: [phone]   Best Call Back Number, if different:  Additional Information: pt is requesting to speak with the nurse, in regards to a request made on last week; pt would like to speak with the provider to discuss his abdominal swelling; please advise.

## 2024-05-20 NOTE — TELEPHONE ENCOUNTER
As discussed last week, DR. LAUREANO IS OUT OF THE OFFICE, unable to make additional recommendations until he returns

## 2024-05-28 ENCOUNTER — TELEPHONE (OUTPATIENT)
Dept: INFECTIOUS DISEASES | Facility: CLINIC | Age: 69
End: 2024-05-28
Payer: MEDICARE

## 2024-05-28 ENCOUNTER — DOCUMENT SCAN (OUTPATIENT)
Dept: HOME HEALTH SERVICES | Facility: HOSPITAL | Age: 69
End: 2024-05-28
Payer: MEDICARE

## 2024-05-28 NOTE — TELEPHONE ENCOUNTER
Patient has conflict with tomorrow's appointment. Sternal wound has not yet completely healed. Office will contact him to reschedule.

## 2024-05-29 ENCOUNTER — OFFICE VISIT (OUTPATIENT)
Dept: INFECTIOUS DISEASES | Facility: CLINIC | Age: 69
End: 2024-05-29
Payer: MEDICARE

## 2024-05-29 VITALS
HEIGHT: 72 IN | HEART RATE: 76 BPM | DIASTOLIC BLOOD PRESSURE: 76 MMHG | BODY MASS INDEX: 28.44 KG/M2 | WEIGHT: 210 LBS | SYSTOLIC BLOOD PRESSURE: 149 MMHG | OXYGEN SATURATION: 98 % | RESPIRATION RATE: 18 BRPM

## 2024-05-29 DIAGNOSIS — M86.9 OSTEOMYELITIS OF OTHER SITE, UNSPECIFIED TYPE: Primary | ICD-10-CM

## 2024-05-29 DIAGNOSIS — L08.9 STERNAL WOUND INFECTION: ICD-10-CM

## 2024-05-29 DIAGNOSIS — Z95.1 HX OF CABG: ICD-10-CM

## 2024-05-29 DIAGNOSIS — S21.101A STERNAL WOUND INFECTION: ICD-10-CM

## 2024-05-29 PROCEDURE — 3288F FALL RISK ASSESSMENT DOCD: CPT | Mod: CPTII,S$GLB,, | Performed by: GENERAL PRACTICE

## 2024-05-29 PROCEDURE — 3078F DIAST BP <80 MM HG: CPT | Mod: CPTII,S$GLB,, | Performed by: GENERAL PRACTICE

## 2024-05-29 PROCEDURE — 3008F BODY MASS INDEX DOCD: CPT | Mod: CPTII,S$GLB,, | Performed by: GENERAL PRACTICE

## 2024-05-29 PROCEDURE — 3077F SYST BP >= 140 MM HG: CPT | Mod: CPTII,S$GLB,, | Performed by: GENERAL PRACTICE

## 2024-05-29 PROCEDURE — 99213 OFFICE O/P EST LOW 20 MIN: CPT | Mod: S$GLB,,, | Performed by: GENERAL PRACTICE

## 2024-05-29 PROCEDURE — 1101F PT FALLS ASSESS-DOCD LE1/YR: CPT | Mod: CPTII,S$GLB,, | Performed by: GENERAL PRACTICE

## 2024-05-29 PROCEDURE — 4010F ACE/ARB THERAPY RXD/TAKEN: CPT | Mod: CPTII,S$GLB,, | Performed by: GENERAL PRACTICE

## 2024-05-29 PROCEDURE — 1159F MED LIST DOCD IN RCRD: CPT | Mod: CPTII,S$GLB,, | Performed by: GENERAL PRACTICE

## 2024-05-29 PROCEDURE — 99999 PR PBB SHADOW E&M-EST. PATIENT-LVL III: CPT | Mod: PBBFAC,,, | Performed by: GENERAL PRACTICE
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
## 2024-07-01 PROBLEM — Z09 HOSPITAL DISCHARGE FOLLOW-UP: Status: RESOLVED | Noted: 2024-03-28 | Resolved: 2024-07-01

## 2024-08-29 NOTE — PROGRESS NOTES
Subjective:      Patient ID: Ahmet Edmonds is a 69 y.o. male.    Chief Complaint: Blister (Reoccurring Blister on bottom of sternum (developed in a week)  )      HPI: Patient here today for c/o sternal blister. S/p CABG MAZE with sternal debridement and hardware removal with neg pressure dressing.  H/o wound infection +Klebsiella. Denies fever, chills, or sweats. Area of concern is tender. No d/c.  He is fearful of continued recurrent issues and prognosis.    Also, undergoing tx for prostate CA.     Review of patient's allergies indicates:  No Known Allergies    Review of Systems  Constitutional: No fever, No chills, No sweats,   Respiratory: No shortness of breath, No exertional dyspnea.   Cardiovascular: No chest pain, No palpitations, No peripheral edema.  Integumentary: sternal wound recurrent infection    Objective:   Visit Vitals  BP (!) 140/80 (BP Location: Left arm, Patient Position: Sitting, BP Method: Small (Manual))   Pulse 66   Resp 16   Ht 6' (1.829 m)   Wt 98.4 kg (217 lb)   SpO2 96%   BMI 29.43 kg/m²     The patient's weight trend is below:   Wt Readings from Last 4 Encounters:   08/30/24 98.4 kg (217 lb)   05/29/24 95.3 kg (210 lb)   05/01/24 94.3 kg (208 lb)   05/01/24 96.2 kg (212 lb)        Physical Exam  Vitals and nursing note reviewed.   Constitutional:       General: He is not in acute distress.     Appearance: Normal appearance. He is normal weight. He is not ill-appearing, toxic-appearing or diaphoretic.   HENT:      Head: Normocephalic and atraumatic.   Pulmonary:      Effort: Pulmonary effort is normal.   Skin:     General: Skin is warm and dry.      Findings: Abscess present.          Neurological:      General: No focal deficit present.      Mental Status: He is alert and oriented to person, place, and time. Mental status is at baseline.         Assessment/Plan:   1. Sternal wound infection  Cleaned abscess with with attempt to aspirate contents resulting in excessive d/c--wound  culture collected per curette  Gentle expression of abscess, tolerated well  Work up with labs  Will forward note to Dr. Flynn, I&D  Collab with Dr. Shipley in Dr. De La Fuente's absence with recommendation for broad coverage with clindamycin and Cipro  Keep area clean/dry, cover with bandage  Follow up as needed for worsening infection  ER precautions    - Wound Culture  - clindamycin (CLEOCIN) 300 MG capsule; Take 1 capsule (300 mg total) by mouth every 8 (eight) hours. for 10 days  Dispense: 30 capsule; Refill: 0  - ciprofloxacin HCl (CIPRO) 500 MG tablet; Take 1 tablet (500 mg total) by mouth 2 (two) times daily. for 10 days  Dispense: 20 tablet; Refill: 0  - CBC Auto Differential; Future  - Comprehensive Metabolic Panel; Future  - Sedimentation rate; Future  - CRP, High Sensitivity; Future  - CBC Auto Differential  - Comprehensive Metabolic Panel  - CRP, High Sensitivity  - INCISION AND DRAINAGE; Future  - Urinalysis, Reflex to Urine Culture; Future  - Urinalysis, Reflex to Urine Culture    2. Prostate cancer  Managed by specialist    3. S/P CABG (coronary artery bypass graft)  Upcoming follow up with CV, Dr. Gill      Medication List with Changes/Refills   New Medications    CIPROFLOXACIN HCL (CIPRO) 500 MG TABLET    Take 1 tablet (500 mg total) by mouth 2 (two) times daily. for 10 days    CLINDAMYCIN (CLEOCIN) 300 MG CAPSULE    Take 1 capsule (300 mg total) by mouth every 8 (eight) hours. for 10 days   Current Medications    ALFUZOSIN (UROXATRAL) 10 MG TB24    Take 10 mg by mouth daily with breakfast.    ASPIRIN 81 MG CHEW    1 tablet once.    BENAZEPRIL-HYDROCHLORTHIAZIDE (LOTENSIN HCT) 20-12.5 MG PER TABLET    Take 0.5 tablets by mouth.    BISOPROLOL (ZEBETA) 5 MG TABLET    Take 2.5 mg by mouth once daily.    ROSUVASTATIN (CRESTOR) 5 MG TABLET    Take 5 mg by mouth.    VALACYCLOVIR (VALTREX) 1000 MG TABLET    Take 1 tablet (1,000 mg total) by mouth once daily.   Discontinued Medications    GABAPENTIN  (NEURONTIN) 100 MG CAPSULE    Take 1 capsule (100 mg total) by mouth 3 (three) times daily.    MULTIVITAMIN (THERAGRAN) PER TABLET    Take 1 tablet by mouth once daily.    RANITIDINE (ZANTAC) 75 MG TABLET    Take 150 mg by mouth daily as needed.    TRAMADOL (ULTRAM) 50 MG TABLET    Take 50 mg by mouth every 6 (six) hours as needed.        I spent a total of 50 minutes on the day of the visit.This includes face to face time and non-face to face time preparing to see the patient (eg, review of tests), obtaining and/or reviewing separately obtained history, documenting clinical information in the electronic or other health record, independently interpreting results and communicating results to the patient/family/caregiver, or care coordinator.    Chemistry:  Lab Results   Component Value Date     03/22/2024    K 4.5 03/22/2024    BUN 17 03/22/2024    CREATININE 0.69 (L) 03/22/2024    CALCIUM 8.8 03/22/2024    LABPROT 14.3 11/14/2023        Hematology:  Lab Results   Component Value Date    WBC 6.4 10/16/2020    HGB 14.2 10/16/2020    HCT 44.1 10/16/2020     10/16/2020       Lipid Panel:  Lab Results   Component Value Date    CHOL 123 10/23/2023    HDL 55 10/23/2023    TRIG 88 10/23/2023

## 2024-08-30 ENCOUNTER — OFFICE VISIT (OUTPATIENT)
Dept: INTERNAL MEDICINE | Facility: CLINIC | Age: 69
End: 2024-08-30
Payer: MEDICARE

## 2024-08-30 VITALS
DIASTOLIC BLOOD PRESSURE: 80 MMHG | OXYGEN SATURATION: 96 % | SYSTOLIC BLOOD PRESSURE: 140 MMHG | HEIGHT: 72 IN | BODY MASS INDEX: 29.39 KG/M2 | HEART RATE: 66 BPM | RESPIRATION RATE: 16 BRPM | WEIGHT: 217 LBS

## 2024-08-30 DIAGNOSIS — L08.9 STERNAL WOUND INFECTION: Primary | ICD-10-CM

## 2024-08-30 DIAGNOSIS — C61 PROSTATE CANCER: ICD-10-CM

## 2024-08-30 DIAGNOSIS — S21.101A STERNAL WOUND INFECTION: Primary | ICD-10-CM

## 2024-08-30 DIAGNOSIS — Z95.1 S/P CABG (CORONARY ARTERY BYPASS GRAFT): ICD-10-CM

## 2024-08-30 PROBLEM — R79.89 LOW SERUM TESTOSTERONE: Status: ACTIVE | Noted: 2017-03-30

## 2024-08-30 PROBLEM — E87.6 HYPOKALEMIA: Status: ACTIVE | Noted: 2023-11-13

## 2024-08-30 PROBLEM — S21.109A WOUND OF STERNAL REGION: Status: ACTIVE | Noted: 2023-11-13

## 2024-08-30 PROBLEM — K21.9 GASTROESOPHAGEAL REFLUX DISEASE WITHOUT ESOPHAGITIS: Status: ACTIVE | Noted: 2024-03-20

## 2024-08-30 PROBLEM — J90 PLEURAL EFFUSION, LEFT: Status: ACTIVE | Noted: 2023-11-13

## 2024-08-30 PROBLEM — N39.0 URINARY TRACT INFECTION: Status: ACTIVE | Noted: 2017-02-13

## 2024-08-30 PROBLEM — N39.41 URGE INCONTINENCE OF URINE: Status: ACTIVE | Noted: 2023-10-03

## 2024-08-30 PROBLEM — I25.810 CORONARY ARTERY DISEASE INVOLVING CORONARY BYPASS GRAFT OF NATIVE HEART WITHOUT ANGINA PECTORIS: Status: ACTIVE | Noted: 2024-03-20

## 2024-08-30 PROBLEM — Z78.9 HEALTH CARE HOME, ACTIVE CARE COORDINATION: Status: ACTIVE | Noted: 2023-11-17

## 2024-08-30 PROBLEM — N40.0 PROSTATISM: Status: ACTIVE | Noted: 2017-02-13

## 2024-08-30 PROBLEM — R36.1 BLOOD IN SEMEN: Status: ACTIVE | Noted: 2017-03-30

## 2024-08-30 PROBLEM — A49.8 KLEBSIELLA INFECTION: Status: ACTIVE | Noted: 2024-03-20

## 2024-08-30 PROBLEM — I21.4 NSTEMI (NON-ST ELEVATED MYOCARDIAL INFARCTION): Status: ACTIVE | Noted: 2023-10-22

## 2024-08-30 LAB
APPEARANCE UR: CLEAR
BACTERIA #/AREA URNS HPF: NORMAL /[HPF]
BILIRUB UR QL STRIP: NEGATIVE
COLOR UR: YELLOW
CRYSTALS URNS MICRO: NORMAL
EPI CELLS #/AREA URNS HPF: NORMAL /HPF (ref 0–10)
GLUCOSE UR QL STRIP: NEGATIVE
HGB UR QL STRIP: NEGATIVE
KETONES UR QL STRIP: NEGATIVE
LEUKOCYTE ESTERASE UR QL STRIP: NEGATIVE
MICRO URNS: NORMAL
MICRO URNS: NORMAL
NITRITE UR QL STRIP: NEGATIVE
PH UR STRIP: 6 [PH] (ref 5–7.5)
PROT UR QL STRIP: NEGATIVE
RBC #/AREA URNS HPF: NORMAL /HPF (ref 0–2)
SP GR UR STRIP: 1.02 (ref 1–1.03)
URINALYSIS REFLEX: NORMAL
UROBILINOGEN UR STRIP-MCNC: 0.2 MG/DL (ref 0.2–1)
WBC #/AREA URNS HPF: NORMAL /HPF (ref 0–5)

## 2024-08-30 PROCEDURE — 87186 SC STD MICRODIL/AGAR DIL: CPT | Performed by: NURSE PRACTITIONER

## 2024-08-30 PROCEDURE — 87077 CULTURE AEROBIC IDENTIFY: CPT | Performed by: NURSE PRACTITIONER

## 2024-08-30 RX ORDER — CIPROFLOXACIN 500 MG/1
500 TABLET ORAL 2 TIMES DAILY
Qty: 20 TABLET | Refills: 0 | Status: SHIPPED | OUTPATIENT
Start: 2024-08-30 | End: 2024-09-09

## 2024-08-30 RX ORDER — CLINDAMYCIN HYDROCHLORIDE 300 MG/1
300 CAPSULE ORAL EVERY 8 HOURS
Qty: 30 CAPSULE | Refills: 0 | Status: SHIPPED | OUTPATIENT
Start: 2024-08-30 | End: 2024-09-09

## 2024-08-30 NOTE — Clinical Note
Per patient's request, please review my note and tx.  I believe you have an upcoming follow up with him.

## 2024-08-31 LAB
ALBUMIN SERPL-MCNC: 4.5 G/DL (ref 3.8–4.8)
ALP SERPL-CCNC: 108 IU/L (ref 44–121)
ALT SERPL-CCNC: 21 IU/L (ref 0–44)
AST SERPL-CCNC: 18 IU/L (ref 0–40)
BACTERIA WND CULT: ABNORMAL
BASOPHILS # BLD AUTO: 0 X10E3/UL (ref 0–0.2)
BASOPHILS NFR BLD AUTO: 1 %
BILIRUB SERPL-MCNC: 0.5 MG/DL (ref 0–1.2)
BUN SERPL-MCNC: 16 MG/DL (ref 8–27)
BUN/CREAT SERPL: 16 (ref 10–24)
CALCIUM SERPL-MCNC: 9.7 MG/DL (ref 8.6–10.2)
CHLORIDE SERPL-SCNC: 98 MMOL/L (ref 96–106)
CO2 SERPL-SCNC: 24 MMOL/L (ref 20–29)
CREAT SERPL-MCNC: 0.97 MG/DL (ref 0.76–1.27)
CRP SERPL HS-MCNC: 2.6 MG/L (ref 0–3)
EOSINOPHIL # BLD AUTO: 0.1 X10E3/UL (ref 0–0.4)
EOSINOPHIL NFR BLD AUTO: 1 %
ERYTHROCYTE [DISTWIDTH] IN BLOOD BY AUTOMATED COUNT: 12.4 % (ref 11.6–15.4)
EST. GFR  (NO RACE VARIABLE): 83 ML/MIN/1.73
GLOBULIN SER CALC-MCNC: 2.9 G/DL (ref 1.5–4.5)
GLUCOSE SERPL-MCNC: 100 MG/DL (ref 70–99)
HCT VFR BLD AUTO: 48.1 % (ref 37.5–51)
HGB BLD-MCNC: 15.5 G/DL (ref 13–17.7)
IMM GRANULOCYTES NFR BLD AUTO: 1 %
LYMPHOCYTES # BLD AUTO: 1 X10E3/UL (ref 0.7–3.1)
LYMPHOCYTES NFR BLD AUTO: 15 %
MCH RBC QN AUTO: 29.9 PG (ref 26.6–33)
MCHC RBC AUTO-ENTMCNC: 32.2 G/DL (ref 31.5–35.7)
MCV RBC AUTO: 93 FL (ref 79–97)
MONOCYTES # BLD AUTO: 0.5 X10E3/UL (ref 0.1–0.9)
MONOCYTES NFR BLD AUTO: 8 %
NEUTROPHILS # BLD AUTO: 5 X10E3/UL (ref 1.4–7)
NEUTROPHILS NFR BLD AUTO: 74 %
PLATELET # BLD AUTO: 271 X10E3/UL (ref 150–450)
POTASSIUM SERPL-SCNC: 5 MMOL/L (ref 3.5–5.2)
PROT SERPL-MCNC: 7.4 G/DL (ref 6–8.5)
RBC # BLD AUTO: 5.19 X10E6/UL (ref 4.14–5.8)
SODIUM SERPL-SCNC: 134 MMOL/L (ref 134–144)
WBC # BLD AUTO: 6.6 X10E3/UL (ref 3.4–10.8)

## 2024-09-04 ENCOUNTER — OFFICE VISIT (OUTPATIENT)
Dept: INFECTIOUS DISEASES | Facility: CLINIC | Age: 69
End: 2024-09-04
Payer: MEDICARE

## 2024-09-04 VITALS
WEIGHT: 216 LBS | HEART RATE: 81 BPM | SYSTOLIC BLOOD PRESSURE: 158 MMHG | DIASTOLIC BLOOD PRESSURE: 96 MMHG | BODY MASS INDEX: 29.26 KG/M2 | HEIGHT: 72 IN | RESPIRATION RATE: 18 BRPM | OXYGEN SATURATION: 96 %

## 2024-09-04 DIAGNOSIS — A49.8 KLEBSIELLA INFECTION: Primary | ICD-10-CM

## 2024-09-04 DIAGNOSIS — M86.9 OSTEOMYELITIS OF OTHER SITE, UNSPECIFIED TYPE: ICD-10-CM

## 2024-09-04 PROCEDURE — 99999 PR PBB SHADOW E&M-EST. PATIENT-LVL III: CPT | Mod: PBBFAC,,, | Performed by: GENERAL PRACTICE

## 2024-09-04 PROCEDURE — 1101F PT FALLS ASSESS-DOCD LE1/YR: CPT | Mod: CPTII,S$GLB,, | Performed by: GENERAL PRACTICE

## 2024-09-04 PROCEDURE — 4010F ACE/ARB THERAPY RXD/TAKEN: CPT | Mod: CPTII,S$GLB,, | Performed by: GENERAL PRACTICE

## 2024-09-04 PROCEDURE — 99215 OFFICE O/P EST HI 40 MIN: CPT | Mod: S$GLB,,, | Performed by: GENERAL PRACTICE

## 2024-09-04 PROCEDURE — 3080F DIAST BP >= 90 MM HG: CPT | Mod: CPTII,S$GLB,, | Performed by: GENERAL PRACTICE

## 2024-09-04 PROCEDURE — 3288F FALL RISK ASSESSMENT DOCD: CPT | Mod: CPTII,S$GLB,, | Performed by: GENERAL PRACTICE

## 2024-09-04 PROCEDURE — 3077F SYST BP >= 140 MM HG: CPT | Mod: CPTII,S$GLB,, | Performed by: GENERAL PRACTICE

## 2024-09-04 PROCEDURE — 3008F BODY MASS INDEX DOCD: CPT | Mod: CPTII,S$GLB,, | Performed by: GENERAL PRACTICE

## 2024-09-04 RX ORDER — SULFAMETHOXAZOLE AND TRIMETHOPRIM 800; 160 MG/1; MG/1
1 TABLET ORAL 2 TIMES DAILY
Qty: 30 TABLET | Refills: 0 | Status: SHIPPED | OUTPATIENT
Start: 2024-09-04
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
## 2024-09-05 ENCOUNTER — TELEPHONE (OUTPATIENT)
Dept: INTERNAL MEDICINE | Facility: CLINIC | Age: 69
End: 2024-09-05
Payer: MEDICARE

## 2024-09-05 NOTE — TELEPHONE ENCOUNTER
----- Message from Eligio Mares MA sent at 9/3/2024  1:21 PM CDT -----    ----- Message -----  From: Natalee Taylor  Sent: 9/3/2024   1:18 PM CDT  To: Sudhakar FRIAS Staff    .Type:  Patient Returning Call    Who Called: pt    Who Left Message for Patient: Geoffrey    Does the patient know what this is regarding?: lab results    Would the patient rather a call back or a response via [facility]?      Best Call Back Number: [phone]    Additional Information:  pt calling for test results thanks

## 2024-09-05 NOTE — TELEPHONE ENCOUNTER
Cheyanne Murillo, MISSAEL Edward Staff  Please let pt know labs and UA stable.  Wound culture does note that current antibiotics not covering + klebsiella. Let him know I plan to contact ID regarding next plan of care.    Staff, please reach out to infectious disease, Dr Flynn, and speak to nurse or NP regarding these findings (this is 3rd recurrence of this same infection to same site and he has previously managed patient--I would like to include him in tx plan).

## 2024-09-05 NOTE — TELEPHONE ENCOUNTER
Courtesy Call made to office, nurse has been made aware of the results needing further review/recommendations.    She has pulled so the physician can review

## 2024-09-09 DIAGNOSIS — E78.2 MIXED HYPERLIPIDEMIA: Primary | ICD-10-CM

## 2024-09-10 ENCOUNTER — TELEPHONE (OUTPATIENT)
Dept: INFECTIOUS DISEASES | Facility: CLINIC | Age: 69
End: 2024-09-10
Payer: MEDICARE

## 2024-09-10 NOTE — TELEPHONE ENCOUNTER
Patient called clinic asking for refill of topical Gentamicin. Initial course was not prescribed by ID. He is currently on TMP/SMX 1 tab DS po q12 hours. Would not add topical antimicrobial therapy at this time. Repeat blood work as ordered today. Will evaluate further at visit scheduled on 9/12/2024. Spoke with patient and relayed this plan of care.

## 2024-09-12 ENCOUNTER — OFFICE VISIT (OUTPATIENT)
Dept: INFECTIOUS DISEASES | Facility: CLINIC | Age: 69
End: 2024-09-12
Payer: MEDICARE

## 2024-09-12 VITALS
HEIGHT: 72 IN | SYSTOLIC BLOOD PRESSURE: 163 MMHG | BODY MASS INDEX: 29.53 KG/M2 | DIASTOLIC BLOOD PRESSURE: 79 MMHG | RESPIRATION RATE: 18 BRPM | OXYGEN SATURATION: 97 % | HEART RATE: 68 BPM | WEIGHT: 218 LBS

## 2024-09-12 DIAGNOSIS — L08.9 STERNAL WOUND INFECTION: ICD-10-CM

## 2024-09-12 DIAGNOSIS — A49.8 KLEBSIELLA INFECTION: Primary | ICD-10-CM

## 2024-09-12 DIAGNOSIS — S21.101A STERNAL WOUND INFECTION: ICD-10-CM

## 2024-09-12 PROCEDURE — 99999 PR PBB SHADOW E&M-EST. PATIENT-LVL III: CPT | Mod: PBBFAC,,,

## 2024-09-12 PROCEDURE — 3077F SYST BP >= 140 MM HG: CPT | Mod: CPTII,S$GLB,,

## 2024-09-12 PROCEDURE — 1101F PT FALLS ASSESS-DOCD LE1/YR: CPT | Mod: CPTII,S$GLB,,

## 2024-09-12 PROCEDURE — 3288F FALL RISK ASSESSMENT DOCD: CPT | Mod: CPTII,S$GLB,,

## 2024-09-12 PROCEDURE — 99214 OFFICE O/P EST MOD 30 MIN: CPT | Mod: S$GLB,,,

## 2024-09-12 PROCEDURE — 3078F DIAST BP <80 MM HG: CPT | Mod: CPTII,S$GLB,,

## 2024-09-12 PROCEDURE — 4010F ACE/ARB THERAPY RXD/TAKEN: CPT | Mod: CPTII,S$GLB,,

## 2024-09-12 PROCEDURE — 1159F MED LIST DOCD IN RCRD: CPT | Mod: CPTII,S$GLB,,

## 2024-09-12 PROCEDURE — 3008F BODY MASS INDEX DOCD: CPT | Mod: CPTII,S$GLB,,
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
## 2024-09-17 DIAGNOSIS — A49.8 KLEBSIELLA INFECTION: Primary | ICD-10-CM

## 2024-09-18 LAB
ALBUMIN SERPL-MCNC: 4.3 G/DL (ref 3.8–4.8)
ALP SERPL-CCNC: 99 IU/L (ref 44–121)
ALT SERPL-CCNC: 32 IU/L (ref 0–44)
AST SERPL-CCNC: 22 IU/L (ref 0–40)
BASOPHILS # BLD AUTO: 0 X10E3/UL (ref 0–0.2)
BASOPHILS NFR BLD AUTO: 1 %
BILIRUB SERPL-MCNC: 0.4 MG/DL (ref 0–1.2)
BUN SERPL-MCNC: 14 MG/DL (ref 8–27)
BUN/CREAT SERPL: 14 (ref 10–24)
CALCIUM SERPL-MCNC: 9.5 MG/DL (ref 8.6–10.2)
CHLORIDE SERPL-SCNC: 100 MMOL/L (ref 96–106)
CHOLEST SERPL-MCNC: 159 MG/DL (ref 100–199)
CO2 SERPL-SCNC: 23 MMOL/L (ref 20–29)
CREAT SERPL-MCNC: 1.03 MG/DL (ref 0.76–1.27)
CRP SERPL-MCNC: <3 MG/L (ref 0–10)
EOSINOPHIL # BLD AUTO: 0.1 X10E3/UL (ref 0–0.4)
EOSINOPHIL NFR BLD AUTO: 2 %
ERYTHROCYTE [DISTWIDTH] IN BLOOD BY AUTOMATED COUNT: 12.5 % (ref 11.6–15.4)
ERYTHROCYTE [SEDIMENTATION RATE] IN BLOOD BY WESTERGREN METHOD: 21 MM/HR (ref 0–30)
EST. GFR  (NO RACE VARIABLE): 78 ML/MIN/1.73
GLOBULIN SER CALC-MCNC: 2.8 G/DL (ref 1.5–4.5)
GLUCOSE SERPL-MCNC: 89 MG/DL (ref 70–99)
HCT VFR BLD AUTO: 48.5 % (ref 37.5–51)
HDLC SERPL-MCNC: 50 MG/DL
HGB BLD-MCNC: 15.4 G/DL (ref 13–17.7)
IMM GRANULOCYTES NFR BLD AUTO: 0 %
LDLC SERPL CALC-MCNC: 85 MG/DL (ref 0–99)
LYMPHOCYTES # BLD AUTO: 1.2 X10E3/UL (ref 0.7–3.1)
LYMPHOCYTES NFR BLD AUTO: 22 %
MCH RBC QN AUTO: 29.2 PG (ref 26.6–33)
MCHC RBC AUTO-ENTMCNC: 31.8 G/DL (ref 31.5–35.7)
MCV RBC AUTO: 92 FL (ref 79–97)
MONOCYTES # BLD AUTO: 0.6 X10E3/UL (ref 0.1–0.9)
MONOCYTES NFR BLD AUTO: 10 %
NEUTROPHILS # BLD AUTO: 3.6 X10E3/UL (ref 1.4–7)
NEUTROPHILS NFR BLD AUTO: 65 %
PLATELET # BLD AUTO: 240 X10E3/UL (ref 150–450)
POTASSIUM SERPL-SCNC: 5.1 MMOL/L (ref 3.5–5.2)
PROT SERPL-MCNC: 7.1 G/DL (ref 6–8.5)
RBC # BLD AUTO: 5.28 X10E6/UL (ref 4.14–5.8)
SODIUM SERPL-SCNC: 138 MMOL/L (ref 134–144)
TRIGL SERPL-MCNC: 135 MG/DL (ref 0–149)
VLDLC SERPL CALC-MCNC: 24 MG/DL (ref 5–40)
WBC # BLD AUTO: 5.6 X10E3/UL (ref 3.4–10.8)

## 2024-09-19 ENCOUNTER — OFFICE VISIT (OUTPATIENT)
Dept: INFECTIOUS DISEASES | Facility: CLINIC | Age: 69
End: 2024-09-19
Payer: MEDICARE

## 2024-09-19 VITALS
WEIGHT: 217 LBS | DIASTOLIC BLOOD PRESSURE: 86 MMHG | HEIGHT: 72 IN | OXYGEN SATURATION: 98 % | TEMPERATURE: 99 F | RESPIRATION RATE: 18 BRPM | HEART RATE: 75 BPM | BODY MASS INDEX: 29.39 KG/M2 | SYSTOLIC BLOOD PRESSURE: 142 MMHG

## 2024-09-19 DIAGNOSIS — M86.9 OSTEOMYELITIS OF OTHER SITE, UNSPECIFIED TYPE: ICD-10-CM

## 2024-09-19 DIAGNOSIS — M86.9 OSTEOMYELITIS, UNSPECIFIED SITE, UNSPECIFIED TYPE: Primary | ICD-10-CM

## 2024-09-19 DIAGNOSIS — Z95.1 S/P CABG (CORONARY ARTERY BYPASS GRAFT): ICD-10-CM

## 2024-09-19 DIAGNOSIS — A49.8 KLEBSIELLA INFECTION: ICD-10-CM

## 2024-09-19 PROCEDURE — 99999 PR PBB SHADOW E&M-EST. PATIENT-LVL IV: CPT | Mod: PBBFAC,,, | Performed by: GENERAL PRACTICE

## 2024-09-19 RX ORDER — SULFAMETHOXAZOLE AND TRIMETHOPRIM 800; 160 MG/1; MG/1
1 TABLET ORAL 2 TIMES DAILY
Qty: 56 TABLET | Refills: 0 | Status: SHIPPED | OUTPATIENT
Start: 2024-09-19 | End: 2024-10-17
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
## 2024-09-26 ENCOUNTER — OFFICE VISIT (OUTPATIENT)
Dept: INFECTIOUS DISEASES | Facility: CLINIC | Age: 69
End: 2024-09-26
Payer: MEDICARE

## 2024-09-26 VITALS
OXYGEN SATURATION: 95 % | WEIGHT: 220 LBS | DIASTOLIC BLOOD PRESSURE: 83 MMHG | BODY MASS INDEX: 29.8 KG/M2 | HEIGHT: 72 IN | RESPIRATION RATE: 18 BRPM | SYSTOLIC BLOOD PRESSURE: 136 MMHG | HEART RATE: 71 BPM | TEMPERATURE: 99 F

## 2024-09-26 DIAGNOSIS — A49.8 KLEBSIELLA INFECTION: ICD-10-CM

## 2024-09-26 DIAGNOSIS — M86.9 OSTEOMYELITIS, UNSPECIFIED SITE, UNSPECIFIED TYPE: Primary | ICD-10-CM

## 2024-09-26 PROCEDURE — 99999 PR PBB SHADOW E&M-EST. PATIENT-LVL III: CPT | Mod: PBBFAC,,, | Performed by: GENERAL PRACTICE
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
## 2024-10-09 ENCOUNTER — OFFICE VISIT (OUTPATIENT)
Dept: CARDIAC SURGERY | Facility: CLINIC | Age: 69
End: 2024-10-09
Payer: MEDICARE

## 2024-10-09 VITALS
SYSTOLIC BLOOD PRESSURE: 130 MMHG | DIASTOLIC BLOOD PRESSURE: 77 MMHG | WEIGHT: 224 LBS | BODY MASS INDEX: 30.34 KG/M2 | OXYGEN SATURATION: 100 % | HEART RATE: 63 BPM | HEIGHT: 72 IN

## 2024-10-09 DIAGNOSIS — M86.9 OSTEOMYELITIS, UNSPECIFIED SITE, UNSPECIFIED TYPE: ICD-10-CM

## 2024-10-09 DIAGNOSIS — T81.89XD NON-HEALING SURGICAL WOUND, SUBSEQUENT ENCOUNTER: Primary | ICD-10-CM

## 2024-10-09 DIAGNOSIS — A49.8 KLEBSIELLA INFECTION: ICD-10-CM

## 2024-10-09 PROCEDURE — 1101F PT FALLS ASSESS-DOCD LE1/YR: CPT | Mod: CPTII,,, | Performed by: THORACIC SURGERY (CARDIOTHORACIC VASCULAR SURGERY)

## 2024-10-09 PROCEDURE — 99204 OFFICE O/P NEW MOD 45 MIN: CPT | Mod: ,,, | Performed by: THORACIC SURGERY (CARDIOTHORACIC VASCULAR SURGERY)

## 2024-10-09 PROCEDURE — 4010F ACE/ARB THERAPY RXD/TAKEN: CPT | Mod: CPTII,,, | Performed by: THORACIC SURGERY (CARDIOTHORACIC VASCULAR SURGERY)

## 2024-10-09 PROCEDURE — 3078F DIAST BP <80 MM HG: CPT | Mod: CPTII,,, | Performed by: THORACIC SURGERY (CARDIOTHORACIC VASCULAR SURGERY)

## 2024-10-09 PROCEDURE — 3288F FALL RISK ASSESSMENT DOCD: CPT | Mod: CPTII,,, | Performed by: THORACIC SURGERY (CARDIOTHORACIC VASCULAR SURGERY)

## 2024-10-09 PROCEDURE — 1160F RVW MEDS BY RX/DR IN RCRD: CPT | Mod: CPTII,,, | Performed by: THORACIC SURGERY (CARDIOTHORACIC VASCULAR SURGERY)

## 2024-10-09 PROCEDURE — 3075F SYST BP GE 130 - 139MM HG: CPT | Mod: CPTII,,, | Performed by: THORACIC SURGERY (CARDIOTHORACIC VASCULAR SURGERY)

## 2024-10-09 PROCEDURE — 1159F MED LIST DOCD IN RCRD: CPT | Mod: CPTII,,, | Performed by: THORACIC SURGERY (CARDIOTHORACIC VASCULAR SURGERY)

## 2024-10-09 PROCEDURE — 1126F AMNT PAIN NOTED NONE PRSNT: CPT | Mod: CPTII,,, | Performed by: THORACIC SURGERY (CARDIOTHORACIC VASCULAR SURGERY)

## 2024-10-09 PROCEDURE — 3008F BODY MASS INDEX DOCD: CPT | Mod: CPTII,,, | Performed by: THORACIC SURGERY (CARDIOTHORACIC VASCULAR SURGERY)

## 2024-10-09 NOTE — PROGRESS NOTES
History & Physical    SUBJECTIVE:     History of Present Illness:  The patient presenting for evaluation of recurrent chest wound status post coronary artery bypass grafting with a his sternum requiring multiple operations osteomyelitis and IV antibiotics.  He has a trach over the lower part of the sternum that is draining with granulation tissue.      Chief Complaint   Patient presents with    Pre-op Exam     REFERRAL-DR. ORTIZ-SPOKE W/ DR. QURESHI. S/P CABG 10/24/23-DR. ELLIOTT. DEVELOPED ILEUS, STERNAL DEHISCENCE, S/P MULT SURGERIES, REMOVAL HARDWARE, WASHOUT ETC, RETROSTERNAL FLUID W/ LOCULATED ABSCESS, SUSPECTED OSTEOMYELITIS 8TH RIB & SURROUNDING INTERSTITIAL EDEMA, ON ANTBX, STERNAL WOUND IMPROVED. PMH: HTN.       Review of patient's allergies indicates:  No Known Allergies    Current Outpatient Medications   Medication Sig Dispense Refill    alfuzosin (UROXATRAL) 10 mg Tb24 Take 10 mg by mouth daily with breakfast.      aspirin 81 MG Chew 1 tablet once.      benazepril-hydrochlorthiazide (LOTENSIN HCT) 20-12.5 mg per tablet Take 0.5 tablets by mouth.      bisoprolol (ZEBETA) 5 MG tablet Take 2.5 mg by mouth once daily.      rosuvastatin (CRESTOR) 5 MG tablet Take 5 mg by mouth.      sulfamethoxazole-trimethoprim 800-160mg (BACTRIM DS) 800-160 mg Tab Take 1 tablet by mouth 2 (two) times daily. 56 tablet 0    valACYclovir (VALTREX) 1000 MG tablet Take 1 tablet (1,000 mg total) by mouth once daily. 21 tablet 0     No current facility-administered medications for this visit.       Past Medical History:   Diagnosis Date    Hypertension      Past Surgical History:   Procedure Laterality Date    CARDIAC SURGERY      KNEE ARTHROSCOPY Left      Family History   Problem Relation Name Age of Onset    Heart disease Father Jacob     Hypertension Father Jacob      Social History     Tobacco Use    Smoking status: Never    Smokeless tobacco: Never   Substance Use Topics    Alcohol use: Yes    Drug use: Never        Review of  Systems:  Review of Systems   Constitutional: Negative.    HENT: Negative.     Eyes: Negative.    Respiratory: Negative.     Cardiovascular: Negative.    Gastrointestinal: Negative.    Endocrine: Negative.    Genitourinary: Negative.    Musculoskeletal: Negative.         Claudications   Skin: Negative.    Allergic/Immunologic: Negative.    Neurological: Negative.    Hematological: Negative.    Psychiatric/Behavioral: Negative.       OBJECTIVE:     Vital Signs (Most Recent)  Pulse: 63 (10/09/24 1212)  BP: 130/77 (10/09/24 1212)  SpO2: 100 % (10/09/24 1212)  6' (1.829 m)  101.6 kg (224 lb)     Physical Exam:  Physical Exam  Vitals reviewed.   Constitutional:       Appearance: Normal appearance.   HENT:      Head: Normocephalic and atraumatic.      Nose: Nose normal.      Mouth/Throat:      Mouth: Mucous membranes are dry.      Pharynx: Oropharynx is clear.   Eyes:      Extraocular Movements: Extraocular movements intact.      Conjunctiva/sclera: Conjunctivae normal.      Pupils: Pupils are equal, round, and reactive to light.   Cardiovascular:      Rate and Rhythm: Normal rate and regular rhythm.      Pulses: Normal pulses.   Pulmonary:      Effort: Pulmonary effort is normal.      Breath sounds: Normal breath sounds.   Abdominal:      General: Abdomen is flat.      Palpations: Abdomen is soft.   Musculoskeletal:         General: Normal range of motion.      Cervical back: Neck supple.   Skin:     General: Skin is warm and dry.      Findings: Lesion (Draining sinus at the costo sternal junction with the granulation tissue) present.   Neurological:      General: No focal deficit present.   Psychiatric:         Mood and Affect: Mood normal.       Laboratory:  None      Diagnostic Results:  CT chest reviewed      ASSESSMENT/PLAN:     Draining sinus from the lower anterior chest.  At this time I believe the patient has mostly benefit from a plastic surgery consultation with Dr. Pope for further evaluation.  I do not  believe simple incision and drainage will take care of the problem.

## 2024-10-10 ENCOUNTER — OFFICE VISIT (OUTPATIENT)
Dept: URGENT CARE | Facility: CLINIC | Age: 69
End: 2024-10-10
Payer: MEDICARE

## 2024-10-10 ENCOUNTER — OFFICE VISIT (OUTPATIENT)
Dept: INFECTIOUS DISEASES | Facility: CLINIC | Age: 69
End: 2024-10-10
Payer: MEDICARE

## 2024-10-10 VITALS
BODY MASS INDEX: 30.61 KG/M2 | TEMPERATURE: 100 F | HEIGHT: 72 IN | SYSTOLIC BLOOD PRESSURE: 136 MMHG | DIASTOLIC BLOOD PRESSURE: 82 MMHG | HEART RATE: 81 BPM | OXYGEN SATURATION: 96 % | RESPIRATION RATE: 18 BRPM | WEIGHT: 226 LBS

## 2024-10-10 VITALS
RESPIRATION RATE: 18 BRPM | HEART RATE: 68 BPM | SYSTOLIC BLOOD PRESSURE: 158 MMHG | HEIGHT: 72 IN | DIASTOLIC BLOOD PRESSURE: 98 MMHG | WEIGHT: 226 LBS | TEMPERATURE: 99 F | BODY MASS INDEX: 30.61 KG/M2 | OXYGEN SATURATION: 98 %

## 2024-10-10 DIAGNOSIS — A49.8 KLEBSIELLA INFECTION: Primary | ICD-10-CM

## 2024-10-10 DIAGNOSIS — M54.2 NECK PAIN: Primary | ICD-10-CM

## 2024-10-10 DIAGNOSIS — M86.9 OSTEOMYELITIS OF OTHER SITE, UNSPECIFIED TYPE: ICD-10-CM

## 2024-10-10 LAB
ALBUMIN SERPL-MCNC: 4.2 G/DL (ref 3.8–4.8)
ALP SERPL-CCNC: 82 IU/L (ref 44–121)
ALT SERPL-CCNC: 24 IU/L (ref 0–44)
AST SERPL-CCNC: 19 IU/L (ref 0–40)
BASOPHILS # BLD AUTO: 0 X10E3/UL (ref 0–0.2)
BASOPHILS NFR BLD AUTO: 1 %
BILIRUB SERPL-MCNC: 0.5 MG/DL (ref 0–1.2)
BUN SERPL-MCNC: 19 MG/DL (ref 8–27)
BUN/CREAT SERPL: 19 (ref 10–24)
CALCIUM SERPL-MCNC: 9.1 MG/DL (ref 8.6–10.2)
CHLORIDE SERPL-SCNC: 101 MMOL/L (ref 96–106)
CO2 SERPL-SCNC: 25 MMOL/L (ref 20–29)
CREAT SERPL-MCNC: 1.02 MG/DL (ref 0.76–1.27)
CRP SERPL-MCNC: 14 MG/L (ref 0–10)
EOSINOPHIL # BLD AUTO: 0.2 X10E3/UL (ref 0–0.4)
EOSINOPHIL NFR BLD AUTO: 3 %
ERYTHROCYTE [DISTWIDTH] IN BLOOD BY AUTOMATED COUNT: 12.8 % (ref 11.6–15.4)
ERYTHROCYTE [SEDIMENTATION RATE] IN BLOOD BY WESTERGREN METHOD: 3 MM/HR (ref 0–30)
EST. GFR  (NO RACE VARIABLE): 79 ML/MIN/1.73
GLOBULIN SER CALC-MCNC: 2.4 G/DL (ref 1.5–4.5)
GLUCOSE SERPL-MCNC: 97 MG/DL (ref 70–99)
HCT VFR BLD AUTO: 45.8 % (ref 37.5–51)
HGB BLD-MCNC: 14.4 G/DL (ref 13–17.7)
IMM GRANULOCYTES NFR BLD AUTO: 0 %
LYMPHOCYTES # BLD AUTO: 1 X10E3/UL (ref 0.7–3.1)
LYMPHOCYTES NFR BLD AUTO: 21 %
MCH RBC QN AUTO: 29.4 PG (ref 26.6–33)
MCHC RBC AUTO-ENTMCNC: 31.4 G/DL (ref 31.5–35.7)
MCV RBC AUTO: 94 FL (ref 79–97)
MONOCYTES # BLD AUTO: 0.5 X10E3/UL (ref 0.1–0.9)
MONOCYTES NFR BLD AUTO: 9 %
NEUTROPHILS # BLD AUTO: 3.2 X10E3/UL (ref 1.4–7)
NEUTROPHILS NFR BLD AUTO: 66 %
PLATELET # BLD AUTO: 175 X10E3/UL (ref 150–450)
POTASSIUM SERPL-SCNC: 4.9 MMOL/L (ref 3.5–5.2)
PROT SERPL-MCNC: 6.6 G/DL (ref 6–8.5)
RBC # BLD AUTO: 4.89 X10E6/UL (ref 4.14–5.8)
SODIUM SERPL-SCNC: 140 MMOL/L (ref 134–144)
SPECIMEN STATUS REPORT: NORMAL
WBC # BLD AUTO: 4.9 X10E3/UL (ref 3.4–10.8)

## 2024-10-10 PROCEDURE — 99999 PR PBB SHADOW E&M-EST. PATIENT-LVL III: CPT | Mod: PBBFAC,,, | Performed by: GENERAL PRACTICE

## 2024-10-10 PROCEDURE — 99213 OFFICE O/P EST LOW 20 MIN: CPT | Mod: ,,, | Performed by: FAMILY MEDICINE

## 2024-10-10 RX ORDER — TIZANIDINE 4 MG/1
4 TABLET ORAL EVERY 6 HOURS PRN
Qty: 12 TABLET | Refills: 0 | Status: SHIPPED | OUTPATIENT
Start: 2024-10-10 | End: 2024-10-20
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
## 2024-10-10 NOTE — PATIENT INSTRUCTIONS
Medication sent to pharmacy. Take medications as prescribed.  Muscle relaxer may make you sleepy.  Do not drive or drink alcohol while taking this medication.   Try heat or ice, apply for 10-20 minutes at a time several times a day. Put a thin cloth between the heat or ice and your skin.  Gently rub or massage the area to relieve pain and help with blood flow.  Do not do anything that makes the pain worse.   Begin to slowly do neck range of motion exercises as tolerated.  Seek immediate medical care if you develop numbness, tingling, unable to move extremities.  You have new or worsening pain or weakness, If you lose bowel or bladder function or if you are not getting better as expected.
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
## 2024-10-23 ENCOUNTER — OFFICE VISIT (OUTPATIENT)
Dept: INFECTIOUS DISEASES | Facility: CLINIC | Age: 69
End: 2024-10-23
Payer: MEDICARE

## 2024-10-23 VITALS
DIASTOLIC BLOOD PRESSURE: 76 MMHG | WEIGHT: 218 LBS | BODY MASS INDEX: 29.53 KG/M2 | OXYGEN SATURATION: 97 % | RESPIRATION RATE: 18 BRPM | TEMPERATURE: 99 F | HEIGHT: 72 IN | SYSTOLIC BLOOD PRESSURE: 146 MMHG | HEART RATE: 74 BPM

## 2024-10-23 DIAGNOSIS — Z95.1 S/P CABG (CORONARY ARTERY BYPASS GRAFT): ICD-10-CM

## 2024-10-23 DIAGNOSIS — M86.9 OSTEOMYELITIS OF OTHER SITE, UNSPECIFIED TYPE: Primary | ICD-10-CM

## 2024-10-23 DIAGNOSIS — A49.8 KLEBSIELLA INFECTION: ICD-10-CM

## 2024-10-23 DIAGNOSIS — S21.109A WOUND OF STERNAL REGION: ICD-10-CM

## 2024-10-23 PROCEDURE — 99999 PR PBB SHADOW E&M-EST. PATIENT-LVL IV: CPT | Mod: PBBFAC,,, | Performed by: GENERAL PRACTICE

## 2024-10-23 PROCEDURE — 1126F AMNT PAIN NOTED NONE PRSNT: CPT | Mod: CPTII,S$GLB,, | Performed by: GENERAL PRACTICE

## 2024-10-23 PROCEDURE — 4010F ACE/ARB THERAPY RXD/TAKEN: CPT | Mod: CPTII,S$GLB,, | Performed by: GENERAL PRACTICE

## 2024-10-23 PROCEDURE — 3077F SYST BP >= 140 MM HG: CPT | Mod: CPTII,S$GLB,, | Performed by: GENERAL PRACTICE

## 2024-10-23 PROCEDURE — 3078F DIAST BP <80 MM HG: CPT | Mod: CPTII,S$GLB,, | Performed by: GENERAL PRACTICE

## 2024-10-23 PROCEDURE — 3008F BODY MASS INDEX DOCD: CPT | Mod: CPTII,S$GLB,, | Performed by: GENERAL PRACTICE

## 2024-10-23 PROCEDURE — 99214 OFFICE O/P EST MOD 30 MIN: CPT | Mod: S$GLB,,, | Performed by: GENERAL PRACTICE

## 2024-10-23 PROCEDURE — 3288F FALL RISK ASSESSMENT DOCD: CPT | Mod: CPTII,S$GLB,, | Performed by: GENERAL PRACTICE

## 2024-10-23 PROCEDURE — 1101F PT FALLS ASSESS-DOCD LE1/YR: CPT | Mod: CPTII,S$GLB,, | Performed by: GENERAL PRACTICE

## 2024-10-23 RX ORDER — SULFAMETHOXAZOLE AND TRIMETHOPRIM 800; 160 MG/1; MG/1
1 TABLET ORAL 2 TIMES DAILY
Qty: 60 TABLET | Refills: 0 | Status: SHIPPED | OUTPATIENT
Start: 2024-10-23 | End: 2024-11-22
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
## 2024-11-21 ENCOUNTER — TELEPHONE (OUTPATIENT)
Dept: INFECTIOUS DISEASES | Facility: CLINIC | Age: 69
End: 2024-11-21

## 2024-11-21 ENCOUNTER — OFFICE VISIT (OUTPATIENT)
Dept: INFECTIOUS DISEASES | Facility: CLINIC | Age: 69
End: 2024-11-21
Payer: MEDICARE

## 2024-11-21 VITALS
HEIGHT: 72 IN | WEIGHT: 221 LBS | BODY MASS INDEX: 29.93 KG/M2 | SYSTOLIC BLOOD PRESSURE: 160 MMHG | TEMPERATURE: 99 F | RESPIRATION RATE: 18 BRPM | DIASTOLIC BLOOD PRESSURE: 86 MMHG | OXYGEN SATURATION: 98 % | HEART RATE: 73 BPM

## 2024-11-21 DIAGNOSIS — S21.109A WOUND OF STERNAL REGION: ICD-10-CM

## 2024-11-21 DIAGNOSIS — M86.9 OSTEOMYELITIS OF OTHER SITE, UNSPECIFIED TYPE: ICD-10-CM

## 2024-11-21 DIAGNOSIS — A49.8 KLEBSIELLA INFECTION: ICD-10-CM

## 2024-11-21 DIAGNOSIS — I25.810 CORONARY ARTERY DISEASE INVOLVING CORONARY BYPASS GRAFT OF NATIVE HEART WITHOUT ANGINA PECTORIS: Primary | ICD-10-CM

## 2024-11-21 DIAGNOSIS — Z95.1 S/P CABG (CORONARY ARTERY BYPASS GRAFT): ICD-10-CM

## 2024-11-21 DIAGNOSIS — M86.9 OSTEOMYELITIS OF OTHER SITE, UNSPECIFIED TYPE: Primary | ICD-10-CM

## 2024-11-21 PROCEDURE — 3288F FALL RISK ASSESSMENT DOCD: CPT | Mod: CPTII,S$GLB,, | Performed by: GENERAL PRACTICE

## 2024-11-21 PROCEDURE — 4010F ACE/ARB THERAPY RXD/TAKEN: CPT | Mod: CPTII,S$GLB,, | Performed by: GENERAL PRACTICE

## 2024-11-21 PROCEDURE — 99214 OFFICE O/P EST MOD 30 MIN: CPT | Mod: S$GLB,,, | Performed by: GENERAL PRACTICE

## 2024-11-21 PROCEDURE — 3079F DIAST BP 80-89 MM HG: CPT | Mod: CPTII,S$GLB,, | Performed by: GENERAL PRACTICE

## 2024-11-21 PROCEDURE — 1125F AMNT PAIN NOTED PAIN PRSNT: CPT | Mod: CPTII,S$GLB,, | Performed by: GENERAL PRACTICE

## 2024-11-21 PROCEDURE — 3008F BODY MASS INDEX DOCD: CPT | Mod: CPTII,S$GLB,, | Performed by: GENERAL PRACTICE

## 2024-11-21 PROCEDURE — 3077F SYST BP >= 140 MM HG: CPT | Mod: CPTII,S$GLB,, | Performed by: GENERAL PRACTICE

## 2024-11-21 PROCEDURE — 99999 PR PBB SHADOW E&M-EST. PATIENT-LVL IV: CPT | Mod: PBBFAC,,, | Performed by: GENERAL PRACTICE

## 2024-11-21 PROCEDURE — 1101F PT FALLS ASSESS-DOCD LE1/YR: CPT | Mod: CPTII,S$GLB,, | Performed by: GENERAL PRACTICE

## 2024-11-21 RX ORDER — SULFAMETHOXAZOLE AND TRIMETHOPRIM 800; 160 MG/1; MG/1
1 TABLET ORAL 2 TIMES DAILY
Qty: 60 TABLET | Refills: 1 | Status: SHIPPED | OUTPATIENT
Start: 2024-11-21 | End: 2025-01-20
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
## 2024-11-21 NOTE — TELEPHONE ENCOUNTER
Attempted to contact Dr. Damian salinas office to ask when is the soonest they would be able to take this patient in for a referral.. I did have to leave a message for a return call.

## 2024-11-25 DIAGNOSIS — Z00.00 WELLNESS EXAMINATION: ICD-10-CM

## 2024-11-25 DIAGNOSIS — R53.83 FATIGUE, UNSPECIFIED TYPE: ICD-10-CM

## 2024-11-25 DIAGNOSIS — R73.01 ELEVATED FASTING GLUCOSE: ICD-10-CM

## 2024-11-25 DIAGNOSIS — Z12.5 SCREENING FOR PROSTATE CANCER: ICD-10-CM

## 2024-11-25 DIAGNOSIS — I10 ESSENTIAL HYPERTENSION: Primary | ICD-10-CM

## 2024-11-26 ENCOUNTER — TELEPHONE (OUTPATIENT)
Dept: INTERNAL MEDICINE | Facility: CLINIC | Age: 69
End: 2024-11-26
Payer: MEDICARE

## 2024-11-26 NOTE — TELEPHONE ENCOUNTER
"----- Message from Nurse Vela sent at 11/26/2024  7:45 AM CST -----  Regarding: Dr. De La Fuente 12/05/2024 wellness 2:40pm  Are there any outstanding tasks in chart? No, but needs FASTING labs, TO BE DONE AT  "[facility]" or lab location of choice PRIOR to appt    Is there any documentation of tasks? no    Has the pt seen another physician, been to ER, [facility], or admitted to hospital since last visit?    Has the pt done blood work or imaging since last visit?    5. PLEASE HAVE PATIENT BRING MEDICATION LIST OR BOTTLES TO EVERY OFFICE VISIT  "

## 2024-12-02 ENCOUNTER — TELEPHONE (OUTPATIENT)
Dept: INTERNAL MEDICINE | Facility: CLINIC | Age: 69
End: 2024-12-02
Payer: MEDICARE

## 2024-12-02 ENCOUNTER — PATIENT MESSAGE (OUTPATIENT)
Dept: WOUND CARE | Facility: HOSPITAL | Age: 69
End: 2024-12-02
Payer: MEDICARE

## 2024-12-02 DIAGNOSIS — A49.8 KLEBSIELLA INFECTION: ICD-10-CM

## 2024-12-02 DIAGNOSIS — M86.9 OSTEOMYELITIS OF OTHER SITE, UNSPECIFIED TYPE: Primary | ICD-10-CM

## 2024-12-02 NOTE — TELEPHONE ENCOUNTER
----- Message from Shanel sent at 12/2/2024  8:06 AM CST -----  Regarding: med advice  Who Called: Ahmet Edmonds    Caller is requesting assistance/information from provider's office.    Symptoms (please be specific):    How long has patient had these symptoms:    List of preferred pharmacies on file (remove unneeded): [unfilled]  If different, enter pharmacy into here including location and phone number:       Preferred Method of Contact: Phone Call  Patient's Preferred Phone Number on File: [phone]   Best Call Back Number, if different:  Additional Information: pt is requesting a call back, would like to add Sedimentation rate PSA and C-reactive protein to his lab orders at [facility]. States he has an appt in an hour to get labs done

## 2024-12-05 ENCOUNTER — OFFICE VISIT (OUTPATIENT)
Dept: INTERNAL MEDICINE | Facility: CLINIC | Age: 69
End: 2024-12-05
Payer: MEDICARE

## 2024-12-05 VITALS
OXYGEN SATURATION: 98 % | BODY MASS INDEX: 30.48 KG/M2 | HEART RATE: 78 BPM | HEIGHT: 72 IN | SYSTOLIC BLOOD PRESSURE: 118 MMHG | WEIGHT: 225 LBS | DIASTOLIC BLOOD PRESSURE: 76 MMHG

## 2024-12-05 DIAGNOSIS — R97.20 ELEVATED PSA: ICD-10-CM

## 2024-12-05 DIAGNOSIS — T81.30XA WOUND DEHISCENCE: ICD-10-CM

## 2024-12-05 DIAGNOSIS — I25.810 CORONARY ARTERY DISEASE INVOLVING CORONARY BYPASS GRAFT OF NATIVE HEART WITHOUT ANGINA PECTORIS: ICD-10-CM

## 2024-12-05 DIAGNOSIS — A49.8 KLEBSIELLA INFECT: ICD-10-CM

## 2024-12-05 DIAGNOSIS — I10 ESSENTIAL HYPERTENSION: Primary | ICD-10-CM

## 2024-12-05 PROCEDURE — 3288F FALL RISK ASSESSMENT DOCD: CPT | Mod: CPTII,,, | Performed by: INTERNAL MEDICINE

## 2024-12-05 PROCEDURE — 1159F MED LIST DOCD IN RCRD: CPT | Mod: CPTII,,, | Performed by: INTERNAL MEDICINE

## 2024-12-05 PROCEDURE — 1160F RVW MEDS BY RX/DR IN RCRD: CPT | Mod: CPTII,,, | Performed by: INTERNAL MEDICINE

## 2024-12-05 PROCEDURE — 99214 OFFICE O/P EST MOD 30 MIN: CPT | Mod: ,,, | Performed by: INTERNAL MEDICINE

## 2024-12-05 PROCEDURE — 3078F DIAST BP <80 MM HG: CPT | Mod: CPTII,,, | Performed by: INTERNAL MEDICINE

## 2024-12-05 PROCEDURE — 1125F AMNT PAIN NOTED PAIN PRSNT: CPT | Mod: CPTII,,, | Performed by: INTERNAL MEDICINE

## 2024-12-05 PROCEDURE — 3008F BODY MASS INDEX DOCD: CPT | Mod: CPTII,,, | Performed by: INTERNAL MEDICINE

## 2024-12-05 PROCEDURE — 4010F ACE/ARB THERAPY RXD/TAKEN: CPT | Mod: CPTII,,, | Performed by: INTERNAL MEDICINE

## 2024-12-05 PROCEDURE — 3074F SYST BP LT 130 MM HG: CPT | Mod: CPTII,,, | Performed by: INTERNAL MEDICINE

## 2024-12-05 PROCEDURE — 1101F PT FALLS ASSESS-DOCD LE1/YR: CPT | Mod: CPTII,,, | Performed by: INTERNAL MEDICINE

## 2024-12-05 NOTE — PROGRESS NOTES
Patient ID: Ahmet Edmonds is a 69 y.o. male.  Chief Complaint: Follow-up    HPI:   70 yo male       Current Outpatient Medications:     alfuzosin (UROXATRAL) 10 mg Tb24, Take 10 mg by mouth daily with breakfast., Disp: , Rfl:     aspirin 81 MG Chew, 1 tablet once., Disp: , Rfl:     benazepril-hydrochlorthiazide (LOTENSIN HCT) 20-12.5 mg per tablet, Take 0.5 tablets by mouth., Disp: , Rfl:     bisoprolol (ZEBETA) 5 MG tablet, Take 2.5 mg by mouth once daily., Disp: , Rfl:     rosuvastatin (CRESTOR) 5 MG tablet, Take 5 mg by mouth., Disp: , Rfl:     sulfamethoxazole-trimethoprim 800-160mg (BACTRIM DS) 800-160 mg Tab, Take 1 tablet by mouth 2 (two) times daily., Disp: 60 tablet, Rfl: 1    valACYclovir (VALTREX) 1000 MG tablet, Take 1 tablet (1,000 mg total) by mouth once daily. (Patient not taking: Reported on 12/5/2024), Disp: 21 tablet, Rfl: 0  ROS:   Constitutional: No weight gain, No fever, No chills, No fatigue.   Eyes: No blurring, No visual disturbances.   Ear/Nose/Mouth/Throat: No decreased hearing, No ear pain, No nasal congestion, No sore throat.   Respiratory: No shortness of breath, No cough, No wheezing.   Cardiovascular: No chest pain, No palpitations, No peripheral edema.   Gastrointestinal: No nausea, No vomiting, No diarrhea, No constipation, No abdominal pain.   Genitourinary: No dysuria, No hematuria.   Hematology/Lymphatics: No bruising tendency, No bleeding tendency, No swollen lymph glands.   Endocrine: No excessive thirst, No polyuria, No excessive hunger.   Musculoskeletal: No joint pain, No muscle pain, No decreased range of motion.   Integumentary: No rash, No pruritus.   Neurologic: No abnormal balance, No confusion, No headache.   Psychiatric: No anxiety, No depression, Not suicidal, No hallucinations.    PE/Vitals:   /76 (BP Location: Left arm, Patient Position: Sitting)   Pulse 78   Ht 6' (1.829 m)   Wt 102.1 kg (225 lb)   SpO2 98%   BMI 30.52 kg/m²   General: Alert and  oriented, No acute distress.   Eye: Normal conjunctiva without exudate.  HENMT: Normocephalic/AT, Normal hearing, Oral mucosa is moist and pink   Neck: No goiter visualized.   Respiratory: Lungs CTAB, Respirations are non-labored, Breath sounds are equal, Symmetrical chest wall expansion.  Cardiovascular: Normal rate, Regular rhythm, No murmur, No edema.   Gastrointestinal: Non-distended.   Genitourinary: Deferred.  Musculoskeletal: Normal ROM, Normal gait, No deformities or amputations.  Integumentary: Warm, Dry, Intact. No diaphoresis, or flushing.  Neurologic: No focal deficits, Cranial Nerves II-XII are grossly intact.   Psychiatric: Cooperative, Appropriate mood & affect, Normal judgment, Non-suicidal.  Assessment/Plan   1. Essential hypertension    2. Coronary artery disease involving coronary bypass graft of native heart without angina pectoris    3. Klebsiella infect    4. Elevated PSA    5. Wound dehiscence      No orders of the defined types were placed in this encounter.    Education and counseling done face to face regarding medical conditions and plan. Contact office if new symptoms develop. Should any symptoms ever significantly worsen seek emergency medical attention/go to ER. Follow up at least yearly for wellness or sooner PRN. Nurse to call patient with any results. The patient is receptive, expresses understanding and is agreeable to plan. All questions have been answered.  No follow-ups on file.

## 2024-12-20 ENCOUNTER — LAB VISIT (OUTPATIENT)
Dept: LAB | Facility: HOSPITAL | Age: 69
End: 2024-12-20
Attending: GENERAL PRACTICE
Payer: MEDICARE

## 2024-12-20 DIAGNOSIS — M86.9 OSTEOMYELITIS, UNSPECIFIED SITE, UNSPECIFIED TYPE: ICD-10-CM

## 2024-12-20 DIAGNOSIS — R97.20 ELEVATED PSA: ICD-10-CM

## 2024-12-20 LAB
ALBUMIN SERPL-MCNC: 3.7 G/DL (ref 3.4–4.8)
ALBUMIN/GLOB SERPL: 1.2 RATIO (ref 1.1–2)
ALP SERPL-CCNC: 87 UNIT/L (ref 40–150)
ALT SERPL-CCNC: 34 UNIT/L (ref 0–55)
ANION GAP SERPL CALC-SCNC: 10 MEQ/L
AST SERPL-CCNC: 23 UNIT/L (ref 5–34)
BASOPHILS # BLD AUTO: 0.06 X10(3)/MCL
BASOPHILS NFR BLD AUTO: 0.9 %
BILIRUB SERPL-MCNC: 0.5 MG/DL
BUN SERPL-MCNC: 19.9 MG/DL (ref 8.4–25.7)
CALCIUM SERPL-MCNC: 8.7 MG/DL (ref 8.8–10)
CHLORIDE SERPL-SCNC: 104 MMOL/L (ref 98–107)
CO2 SERPL-SCNC: 22 MMOL/L (ref 23–31)
CREAT SERPL-MCNC: 1.23 MG/DL (ref 0.72–1.25)
CREAT/UREA NIT SERPL: 16
CRP SERPL-MCNC: 2.8 MG/L
EOSINOPHIL # BLD AUTO: 0.22 X10(3)/MCL (ref 0–0.9)
EOSINOPHIL NFR BLD AUTO: 3.3 %
ERYTHROCYTE [DISTWIDTH] IN BLOOD BY AUTOMATED COUNT: 12.7 % (ref 11.5–17)
ERYTHROCYTE [SEDIMENTATION RATE] IN BLOOD: 18 MM/HR (ref 0–15)
GFR SERPLBLD CREATININE-BSD FMLA CKD-EPI: >60 ML/MIN/1.73/M2
GLOBULIN SER-MCNC: 3.1 GM/DL (ref 2.4–3.5)
GLUCOSE SERPL-MCNC: 94 MG/DL (ref 82–115)
HCT VFR BLD AUTO: 45.5 % (ref 42–52)
HGB BLD-MCNC: 15.2 G/DL (ref 14–18)
IMM GRANULOCYTES # BLD AUTO: 0.02 X10(3)/MCL (ref 0–0.04)
IMM GRANULOCYTES NFR BLD AUTO: 0.3 %
LYMPHOCYTES # BLD AUTO: 1.61 X10(3)/MCL (ref 0.6–4.6)
LYMPHOCYTES NFR BLD AUTO: 24.1 %
MCH RBC QN AUTO: 30.7 PG (ref 27–31)
MCHC RBC AUTO-ENTMCNC: 33.4 G/DL (ref 33–36)
MCV RBC AUTO: 91.9 FL (ref 80–94)
MONOCYTES # BLD AUTO: 0.66 X10(3)/MCL (ref 0.1–1.3)
MONOCYTES NFR BLD AUTO: 9.9 %
NEUTROPHILS # BLD AUTO: 4.12 X10(3)/MCL (ref 2.1–9.2)
NEUTROPHILS NFR BLD AUTO: 61.5 %
NRBC BLD AUTO-RTO: 0 %
PLATELET # BLD AUTO: 212 X10(3)/MCL (ref 130–400)
PMV BLD AUTO: 9.7 FL (ref 7.4–10.4)
POTASSIUM SERPL-SCNC: 4.1 MMOL/L (ref 3.5–5.1)
PROT SERPL-MCNC: 6.8 GM/DL (ref 5.8–7.6)
PSA SERPL-MCNC: 3.85 NG/ML
RBC # BLD AUTO: 4.95 X10(6)/MCL (ref 4.7–6.1)
SODIUM SERPL-SCNC: 136 MMOL/L (ref 136–145)
WBC # BLD AUTO: 6.69 X10(3)/MCL (ref 4.5–11.5)

## 2024-12-20 PROCEDURE — 36415 COLL VENOUS BLD VENIPUNCTURE: CPT

## 2024-12-20 PROCEDURE — 80053 COMPREHEN METABOLIC PANEL: CPT

## 2024-12-20 PROCEDURE — 86140 C-REACTIVE PROTEIN: CPT

## 2024-12-20 PROCEDURE — 85025 COMPLETE CBC W/AUTO DIFF WBC: CPT

## 2024-12-20 PROCEDURE — 85652 RBC SED RATE AUTOMATED: CPT

## 2024-12-20 PROCEDURE — 84153 ASSAY OF PSA TOTAL: CPT

## 2025-01-27 ENCOUNTER — TELEPHONE (OUTPATIENT)
Dept: INFECTIOUS DISEASES | Facility: CLINIC | Age: 70
End: 2025-01-27
Payer: MEDICARE

## 2025-01-27 DIAGNOSIS — Z79.899 HIGH RISK MEDICATION USE: ICD-10-CM

## 2025-01-27 DIAGNOSIS — M86.9 OSTEOMYELITIS OF OTHER SITE, UNSPECIFIED TYPE: Primary | ICD-10-CM

## 2025-01-27 RX ORDER — SULFAMETHOXAZOLE AND TRIMETHOPRIM 800; 160 MG/1; MG/1
1 TABLET ORAL 2 TIMES DAILY
Qty: 60 TABLET | Refills: 1 | Status: SHIPPED | OUTPATIENT
Start: 2025-01-27

## 2025-01-28 DIAGNOSIS — C61 PROSTATE CANCER: Primary | ICD-10-CM

## 2025-01-31 ENCOUNTER — OFFICE VISIT (OUTPATIENT)
Dept: INFECTIOUS DISEASES | Facility: CLINIC | Age: 70
End: 2025-01-31
Payer: MEDICARE

## 2025-01-31 ENCOUNTER — LAB VISIT (OUTPATIENT)
Dept: LAB | Facility: HOSPITAL | Age: 70
End: 2025-01-31
Attending: STUDENT IN AN ORGANIZED HEALTH CARE EDUCATION/TRAINING PROGRAM
Payer: MEDICARE

## 2025-01-31 VITALS
DIASTOLIC BLOOD PRESSURE: 83 MMHG | RESPIRATION RATE: 16 BRPM | WEIGHT: 220 LBS | HEIGHT: 72 IN | BODY MASS INDEX: 29.8 KG/M2 | HEART RATE: 75 BPM | SYSTOLIC BLOOD PRESSURE: 126 MMHG | TEMPERATURE: 98 F

## 2025-01-31 DIAGNOSIS — A49.8 KLEBSIELLA INFECTION: ICD-10-CM

## 2025-01-31 DIAGNOSIS — M86.9 OSTEOMYELITIS OF OTHER SITE, UNSPECIFIED TYPE: ICD-10-CM

## 2025-01-31 DIAGNOSIS — E78.2 MIXED HYPERLIPIDEMIA: ICD-10-CM

## 2025-01-31 DIAGNOSIS — Z12.5 SCREENING FOR PROSTATE CANCER: ICD-10-CM

## 2025-01-31 DIAGNOSIS — M86.9 OSTEOMYELITIS, UNSPECIFIED SITE, UNSPECIFIED TYPE: ICD-10-CM

## 2025-01-31 DIAGNOSIS — Z79.899 HIGH RISK MEDICATION USE: ICD-10-CM

## 2025-01-31 DIAGNOSIS — I10 ESSENTIAL HYPERTENSION: ICD-10-CM

## 2025-01-31 DIAGNOSIS — Z00.00 WELLNESS EXAMINATION: ICD-10-CM

## 2025-01-31 DIAGNOSIS — R19.00 ABDOMINAL MASS, UNSPECIFIED ABDOMINAL LOCATION: ICD-10-CM

## 2025-01-31 DIAGNOSIS — S21.109A WOUND OF STERNAL REGION: ICD-10-CM

## 2025-01-31 DIAGNOSIS — I25.810 CORONARY ARTERY DISEASE INVOLVING CORONARY BYPASS GRAFT OF NATIVE HEART WITHOUT ANGINA PECTORIS: ICD-10-CM

## 2025-01-31 DIAGNOSIS — R73.01 ELEVATED FASTING GLUCOSE: ICD-10-CM

## 2025-01-31 DIAGNOSIS — L08.9 STERNAL WOUND INFECTION: ICD-10-CM

## 2025-01-31 DIAGNOSIS — S21.101A STERNAL WOUND INFECTION: ICD-10-CM

## 2025-01-31 DIAGNOSIS — R53.83 FATIGUE, UNSPECIFIED TYPE: ICD-10-CM

## 2025-01-31 DIAGNOSIS — M86.9 OSTEOMYELITIS OF OTHER SITE, UNSPECIFIED TYPE: Primary | ICD-10-CM

## 2025-01-31 LAB
ALBUMIN SERPL-MCNC: 4 G/DL (ref 3.4–4.8)
ALBUMIN/GLOB SERPL: 1.1 RATIO (ref 1.1–2)
ALP SERPL-CCNC: 75 UNIT/L (ref 40–150)
ALT SERPL-CCNC: 29 UNIT/L (ref 0–55)
ANION GAP SERPL CALC-SCNC: 7 MEQ/L
AST SERPL-CCNC: 21 UNIT/L (ref 5–34)
BASOPHILS # BLD AUTO: 0.04 X10(3)/MCL
BASOPHILS NFR BLD AUTO: 0.7 %
BILIRUB SERPL-MCNC: 0.7 MG/DL
BUN SERPL-MCNC: 18.7 MG/DL (ref 8.4–25.7)
CALCIUM SERPL-MCNC: 9.4 MG/DL (ref 8.8–10)
CHLORIDE SERPL-SCNC: 105 MMOL/L (ref 98–107)
CHOLEST SERPL-MCNC: 154 MG/DL
CHOLEST/HDLC SERPL: 3 {RATIO} (ref 0–5)
CO2 SERPL-SCNC: 25 MMOL/L (ref 23–31)
CREAT SERPL-MCNC: 1.07 MG/DL (ref 0.72–1.25)
CREAT/UREA NIT SERPL: 17
CRP SERPL-MCNC: 2.1 MG/L
EOSINOPHIL # BLD AUTO: 0.08 X10(3)/MCL (ref 0–0.9)
EOSINOPHIL NFR BLD AUTO: 1.5 %
ERYTHROCYTE [DISTWIDTH] IN BLOOD BY AUTOMATED COUNT: 11.8 % (ref 11.5–17)
ERYTHROCYTE [SEDIMENTATION RATE] IN BLOOD: 7 MM/HR (ref 0–15)
EST. AVERAGE GLUCOSE BLD GHB EST-MCNC: 108.3 MG/DL
GFR SERPLBLD CREATININE-BSD FMLA CKD-EPI: >60 ML/MIN/1.73/M2
GLOBULIN SER-MCNC: 3.6 GM/DL (ref 2.4–3.5)
GLUCOSE SERPL-MCNC: 97 MG/DL (ref 82–115)
HBA1C MFR BLD: 5.4 %
HCT VFR BLD AUTO: 48.6 % (ref 42–52)
HDLC SERPL-MCNC: 48 MG/DL (ref 35–60)
HGB BLD-MCNC: 15.9 G/DL (ref 14–18)
IMM GRANULOCYTES # BLD AUTO: 0.02 X10(3)/MCL (ref 0–0.04)
IMM GRANULOCYTES NFR BLD AUTO: 0.4 %
LDLC SERPL CALC-MCNC: 86 MG/DL (ref 50–140)
LYMPHOCYTES # BLD AUTO: 1.28 X10(3)/MCL (ref 0.6–4.6)
LYMPHOCYTES NFR BLD AUTO: 23.5 %
MCH RBC QN AUTO: 30.2 PG (ref 27–31)
MCHC RBC AUTO-ENTMCNC: 32.7 G/DL (ref 33–36)
MCV RBC AUTO: 92.2 FL (ref 80–94)
MONOCYTES # BLD AUTO: 0.47 X10(3)/MCL (ref 0.1–1.3)
MONOCYTES NFR BLD AUTO: 8.6 %
NEUTROPHILS # BLD AUTO: 3.55 X10(3)/MCL (ref 2.1–9.2)
NEUTROPHILS NFR BLD AUTO: 65.3 %
NRBC BLD AUTO-RTO: 0 %
PLATELET # BLD AUTO: 221 X10(3)/MCL (ref 130–400)
PMV BLD AUTO: 9.3 FL (ref 7.4–10.4)
POTASSIUM SERPL-SCNC: 4.4 MMOL/L (ref 3.5–5.1)
PROT SERPL-MCNC: 7.6 GM/DL (ref 5.8–7.6)
PSA SERPL-MCNC: 4.47 NG/ML
RBC # BLD AUTO: 5.27 X10(6)/MCL (ref 4.7–6.1)
SODIUM SERPL-SCNC: 137 MMOL/L (ref 136–145)
TRIGL SERPL-MCNC: 101 MG/DL (ref 34–140)
TSH SERPL-ACNC: 2.3 UIU/ML (ref 0.35–4.94)
VLDLC SERPL CALC-MCNC: 20 MG/DL
WBC # BLD AUTO: 5.44 X10(3)/MCL (ref 4.5–11.5)

## 2025-01-31 PROCEDURE — 80053 COMPREHEN METABOLIC PANEL: CPT

## 2025-01-31 PROCEDURE — 85652 RBC SED RATE AUTOMATED: CPT

## 2025-01-31 PROCEDURE — 99213 OFFICE O/P EST LOW 20 MIN: CPT | Mod: PBBFAC

## 2025-01-31 PROCEDURE — 83036 HEMOGLOBIN GLYCOSYLATED A1C: CPT

## 2025-01-31 PROCEDURE — 80061 LIPID PANEL: CPT

## 2025-01-31 PROCEDURE — 84153 ASSAY OF PSA TOTAL: CPT

## 2025-01-31 PROCEDURE — 84443 ASSAY THYROID STIM HORMONE: CPT

## 2025-01-31 PROCEDURE — 36415 COLL VENOUS BLD VENIPUNCTURE: CPT

## 2025-01-31 PROCEDURE — 85025 COMPLETE CBC W/AUTO DIFF WBC: CPT

## 2025-01-31 PROCEDURE — 86140 C-REACTIVE PROTEIN: CPT

## 2025-01-31 NOTE — PROGRESS NOTES
[facility] Outpatient INFECTIOUS DISEASES Clinic Note    CHIEF COMPLAINT: Referral for sternal osteomyelitis    HISTORY OF PRESENT ILLNESS:     69-year-old male with PMH CAD s/p CABG 10/24/23 complicated by sternal wound dehiscence with Klebsiella aerogenes infection, prostate cancer, HTN who presents to Infectious Disease Clinic today for his initial visit.  He was previously followed by Dr. Flynn for management of his recurrent sternal infection and is currently on Bactrim.    After undergoing his CABG surgery in 10/2023 he developed partial sternal incision the dehiscence which prompted a readmission.  On 11/04/2023 he underwent removal of sternal hardware with sternal rewiring and fixation with plating system.  After this operation his wound dehisced with purulence; cultures obtained showed growth of Klebsiella aerogenes.  He returned to the OR where he underwent removal of wires.  He was treated with ceftriaxone for 4 weeks.  He established are with Dr. Coronado prior to the end date of ceftriaxone and was started on ciprofloxacin due to presence of hardware.  His wound eventually healed until 03/2024 where a blister developed that ruptured; cultures again obtained showed growth of Klebsiella aerogenes.  His primary care physician placed him on topical gentamicin as organism now showed ESBL resistance.  Eventually he was started on meropenem and completed a 6 week course which ended on 05/02/2024.  He was monitored off antibiotics for about 4 months however eventually he had return of a blister over his distal incision area and drainage cultured showed  klebseilla aerogenes.  CT chest completed 09/13/2024 showed suspected osteomyelitis of the left 8th rib along with persistent retrosternal fluid collection.  He was started on Bactrim in 09/2024 (based off organism sensitivities) and has remained on this drug since this time.     Today he states he is doing well and denies any active areas of drainage on his chest  fela.  He reports that he was seen by his PCP in 12/2024 where he was noted to have a small wound on the left lateral incision edge of his chest.  His PCP performed cauterization of this wound which did result in full wound closure.  He voices no complaints today.  He is tolerating his Bactrim without issue.    REVIEW OF SYSTEMS:  Review of Systems   Constitutional:  Negative for chills, fever, malaise/fatigue and weight loss.   HENT:  Negative for congestion and sore throat.    Eyes:  Negative for blurred vision.   Respiratory:  Negative for cough, sputum production and shortness of breath.    Cardiovascular:  Negative for chest pain, palpitations and leg swelling.   Gastrointestinal:  Negative for abdominal pain, diarrhea, nausea and vomiting.   Genitourinary:  Negative for dysuria.   Musculoskeletal:  Negative for joint pain.   Skin:  Negative for rash.   Neurological:  Negative for focal weakness, weakness and headaches.       PREVIOUS MEDICAL HISTORY:  Past Medical History:   Diagnosis Date    Hypertension        PREVIOUS SURGICAL HISTORY:  Past Surgical History:   Procedure Laterality Date    CARDIAC SURGERY      KNEE ARTHROSCOPY Left          ALLERGIES:  Review of patient's allergies indicates:  No Known Allergies      MEDICATIONS:  Current Outpatient Medications on File Prior to Visit   Medication Sig Dispense Refill    aspirin 81 MG Chew 1 tablet once.      benazepril-hydrochlorthiazide (LOTENSIN HCT) 20-12.5 mg per tablet Take 0.5 tablets by mouth.      bisoprolol (ZEBETA) 5 MG tablet Take 2.5 mg by mouth once daily.      rosuvastatin (CRESTOR) 5 MG tablet Take 5 mg by mouth.      sulfamethoxazole-trimethoprim 800-160mg (BACTRIM DS) 800-160 mg Tab Take 1 tablet by mouth 2 (two) times daily. 60 tablet 1    alfuzosin (UROXATRAL) 10 mg Tb24 Take 10 mg by mouth daily with breakfast. (Patient not taking: Reported on 1/31/2025)      valACYclovir (VALTREX) 1000 MG tablet Take 1 tablet (1,000 mg total) by mouth  once daily. (Patient not taking: Reported on 1/31/2025) 21 tablet 0     No current facility-administered medications on file prior to visit.          SOCIAL HISTORY:    reports that he has never smoked. He has never used smokeless tobacco. He reports current alcohol use. He reports that he does not use drugs.      FAMILY HISTORY:   Family History   Problem Relation Name Age of Onset    Heart disease Father Jacob     Hypertension Father Jacob        PHYSICAL EXAMINATION:  /83 (BP Location: Left arm, Patient Position: Sitting)   Pulse 75   Temp 98.3 °F (36.8 °C) (Oral)   Resp 16   Ht 6' (1.829 m)   Wt 99.8 kg (220 lb)   BMI 29.84 kg/m²  Body mass index is 29.84 kg/m².    Gen: awake, alert, NAD  HEENT: NC/AT, EOMi, anicteric sclera, no rhinorrhea, OP clear  Neck: no cervical LAD  CV: regular rate normal rhythm no murmur  Resp: easy WOB on RA CTABL no w/r/r  Abd: +BS, soft, NTTP, no HSM  Ext: warm, no LE edema  Skin: Well healed incision scar over his mid chest. No open wounds visible.   Neuro: no focal deficits       LABORATORY DATA:  Lab Results   Component Value Date    WBC 6.69 12/20/2024    WBC 4.9 10/09/2024    WBC 5.6 09/18/2024    HGB 15.2 12/20/2024    HGB 14.4 10/09/2024    HGB 15.4 09/18/2024     12/20/2024     10/09/2024     09/18/2024    CREATININE 1.23 12/20/2024    CREATININE 1.02 10/09/2024    CREATININE 1.03 09/18/2024    ALT 34 12/20/2024    ALT 24 10/09/2024    ALT 32 09/18/2024    AST 23 12/20/2024    AST 19 10/09/2024    AST 22 09/18/2024    ALKPHOS 87 12/20/2024    BILITOT 0.5 12/20/2024    BILITOT 0.5 10/09/2024    BILITOT 0.4 09/18/2024    INR 1.1 11/14/2023    INR 1.3 11/08/2023    INR 1.2 11/05/2023       MICROBIOLOGY DATA:    3/14/24 Chest fluid exudate cx: ESBL Klebsiella aerogenes      8/30/24 L chest fluid exudate cx: ESBL Klebsiella aerogenes    IMAGING DATA:    9/13/24 CT chest w/ IV  Impression:     Persistent retrosternal fluid collection without  loculated abscess formation.     Suspected osteomyelitis of the proximal left 8th rib.  With associated soft tissue edema extending anteriorly and posteriorly    ASSESSMENT:    69-year-old male with PMH CAD s/p CABG 10/24/23 complicated by sternal wound dehiscence with Klebsiella aerogenes infection, prostate cancer, HTN who presents to Infectious Disease Clinic today for his initial visit.  He was previously followed by Dr. Flynn for management of his recurrent sternal infection and is currently on Bactrim.    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 It seems this may have been used for chronic suppression purposes after his treatment for L 8th rib OM. Last CT chest completed showed aforementioned OM as well as fluid collection. He will need to have repeated imaging of this area, however as he has an upcoming PET CT (scheduled prior to his visit with urology for prostate cancer management) it is reasonable to follow up the results of this test.     It seems there also may still be some HW remaining from his initial CABG, and if so, bactrim suppression should be continued for at least 1 year. Will need to follow up PET CT to see if HW is seen.     Checking labs as well to monitor renal indices while on bactrim.      PLAN:    - Continue Bactrim DS 1 tab PO BID  - Will need to review PET CT for ongoing infectious signs in his chest which should be completed in 2 weeks  - Checking CBC, CMP, CRP, and ESR today  - Duration of bactrim TBD based off labs and  imaging      RTC 2 months      Damian Tompkins MD  Infectious Diseases Faculty

## 2025-02-11 ENCOUNTER — HOSPITAL ENCOUNTER (OUTPATIENT)
Dept: RADIOLOGY | Facility: HOSPITAL | Age: 70
Discharge: HOME | End: 2025-02-11
Attending: UROLOGY
Payer: MEDICARE

## 2025-02-11 DIAGNOSIS — C61 PROSTATE CANCER: ICD-10-CM

## 2025-02-11 PROCEDURE — A9596 HC GALLIUM GA-68 GOZETOTIDE, DX (ILLUCCIX), PER 1 MCI: HCPCS | Mod: TB

## 2025-02-11 PROCEDURE — 78815 PET IMAGE W/CT SKULL-THIGH: CPT | Mod: TC

## 2025-02-11 RX ADMIN — KIT FOR THE PREPARATION OF GALLIUM GA 68 GOZETOTIDE INJECTION 5.5 MILLICURIE: KIT INTRAVENOUS at 01:02

## 2025-02-12 ENCOUNTER — TELEPHONE (OUTPATIENT)
Dept: INTERNAL MEDICINE | Facility: CLINIC | Age: 70
End: 2025-02-12
Payer: MEDICARE

## 2025-02-12 NOTE — TELEPHONE ENCOUNTER
----- Message from Margarita sent at 2/12/2025 10:54 AM CST -----  Who Called: pablo -insurance company     Caller is requesting assistance/information from provider's office.    Symptoms (please be specific):     How long has patient had these symptoms:     List of preferred pharmacies on file (remove unneeded):  [facility] [address], [place] - [address]  If different, enter pharmacy into here including location and phone number:        Preferred Method of Contact: Phone Call  Patient's Preferred Phone Number on File: [phone]   Best Call Back Number, if different:[phone]  Additional Information:  staff stated that pt would like a 90 day script moving forward  with all med

## 2025-02-12 NOTE — TELEPHONE ENCOUNTER
Patient will need to Specify WHAT MEDICATION he is requesting #90 day supply when he calls.  Dr. De La Fuente does NOT prescribe any medication other than Valacyclovir, this medication is prescribed on As Needed Basis

## 2025-02-22 ENCOUNTER — HOSPITAL ENCOUNTER (INPATIENT)
Facility: HOSPITAL | Age: 70
LOS: 3 days | Discharge: HOME | DRG: 092 | End: 2025-02-25
Attending: STUDENT IN AN ORGANIZED HEALTH CARE EDUCATION/TRAINING PROGRAM | Admitting: INTERNAL MEDICINE
Payer: MEDICARE

## 2025-02-22 DIAGNOSIS — R29.818 TRANSIENT NEUROLOGICAL SYMPTOMS: Primary | ICD-10-CM

## 2025-02-22 DIAGNOSIS — R42 DYSEQUILIBRIUM: ICD-10-CM

## 2025-02-22 DIAGNOSIS — R29.818 ACUTE FOCAL NEUROLOGICAL DEFICIT: ICD-10-CM

## 2025-02-22 LAB
ALBUMIN SERPL-MCNC: 4 G/DL (ref 3.4–4.8)
ALBUMIN/GLOB SERPL: 1.3 RATIO (ref 1.1–2)
ALP SERPL-CCNC: 77 UNIT/L (ref 40–150)
ALT SERPL-CCNC: 35 UNIT/L (ref 0–55)
ANION GAP SERPL CALC-SCNC: 14 MEQ/L
ANION GAP SERPL CALC-SCNC: 18 MMOL/L (ref 8–16)
AST SERPL-CCNC: 22 UNIT/L (ref 5–34)
BASOPHILS # BLD AUTO: 0.04 X10(3)/MCL
BASOPHILS NFR BLD AUTO: 0.7 %
BILIRUB SERPL-MCNC: 0.4 MG/DL
BUN SERPL-MCNC: 20.1 MG/DL (ref 8.4–25.7)
BUN SERPL-MCNC: 22 MG/DL (ref 6–30)
CALCIUM SERPL-MCNC: 9.7 MG/DL (ref 8.8–10)
CHLORIDE SERPL-SCNC: 100 MMOL/L (ref 98–107)
CHLORIDE SERPL-SCNC: 99 MMOL/L (ref 95–110)
CHOLEST SERPL-MCNC: 150 MG/DL
CHOLEST/HDLC SERPL: 3 {RATIO} (ref 0–5)
CO2 SERPL-SCNC: 23 MMOL/L (ref 23–31)
CREAT SERPL-MCNC: 1.15 MG/DL (ref 0.72–1.25)
CREAT SERPL-MCNC: 1.2 MG/DL (ref 0.5–1.4)
CREAT/UREA NIT SERPL: 17
EOSINOPHIL # BLD AUTO: 0.11 X10(3)/MCL (ref 0–0.9)
EOSINOPHIL NFR BLD AUTO: 1.9 %
ERYTHROCYTE [DISTWIDTH] IN BLOOD BY AUTOMATED COUNT: 11.8 % (ref 11.5–17)
GFR SERPLBLD CREATININE-BSD FMLA CKD-EPI: >60 ML/MIN/1.73/M2
GLOBULIN SER-MCNC: 3.2 GM/DL (ref 2.4–3.5)
GLUCOSE SERPL-MCNC: 127 MG/DL (ref 82–115)
GLUCOSE SERPL-MCNC: 128 MG/DL (ref 70–110)
HCT VFR BLD AUTO: 47.4 % (ref 42–52)
HCT VFR BLD CALC: 48 %PCV (ref 36–54)
HDLC SERPL-MCNC: 43 MG/DL (ref 35–60)
HGB BLD-MCNC: 16 G/DL
HGB BLD-MCNC: 16.2 G/DL (ref 14–18)
IMM GRANULOCYTES # BLD AUTO: 0.01 X10(3)/MCL (ref 0–0.04)
IMM GRANULOCYTES NFR BLD AUTO: 0.2 %
INR PPP: 1
LDLC SERPL CALC-MCNC: 72 MG/DL (ref 50–140)
LYMPHOCYTES # BLD AUTO: 1.26 X10(3)/MCL (ref 0.6–4.6)
LYMPHOCYTES NFR BLD AUTO: 21.9 %
MCH RBC QN AUTO: 31 PG (ref 27–31)
MCHC RBC AUTO-ENTMCNC: 34.2 G/DL (ref 33–36)
MCV RBC AUTO: 90.6 FL (ref 80–94)
MONOCYTES # BLD AUTO: 0.42 X10(3)/MCL (ref 0.1–1.3)
MONOCYTES NFR BLD AUTO: 7.3 %
NEUTROPHILS # BLD AUTO: 3.91 X10(3)/MCL (ref 2.1–9.2)
NEUTROPHILS NFR BLD AUTO: 68 %
NRBC BLD AUTO-RTO: 0 %
PLATELET # BLD AUTO: 201 X10(3)/MCL (ref 130–400)
PMV BLD AUTO: 9.9 FL (ref 7.4–10.4)
POC IONIZED CALCIUM: 1.28 MMOL/L (ref 1.06–1.42)
POC TCO2 (MEASURED): 25 MMOL/L (ref 23–29)
POCT GLUCOSE: 129 MG/DL (ref 70–110)
POTASSIUM BLD-SCNC: 4.2 MMOL/L (ref 3.5–5.1)
POTASSIUM SERPL-SCNC: 4.2 MMOL/L (ref 3.5–5.1)
PROT SERPL-MCNC: 7.2 GM/DL (ref 5.8–7.6)
PROTHROMBIN TIME: 12.7 SECONDS (ref 12.5–14.5)
RBC # BLD AUTO: 5.23 X10(6)/MCL (ref 4.7–6.1)
SAMPLE: ABNORMAL
SODIUM BLD-SCNC: 138 MMOL/L (ref 136–145)
SODIUM SERPL-SCNC: 137 MMOL/L (ref 136–145)
TRIGL SERPL-MCNC: 177 MG/DL (ref 34–140)
TROPONIN I SERPL-MCNC: <0.01 NG/ML (ref 0–0.04)
TSH SERPL-ACNC: 3.14 UIU/ML (ref 0.35–4.94)
VLDLC SERPL CALC-MCNC: 35 MG/DL
WBC # BLD AUTO: 5.75 X10(3)/MCL (ref 4.5–11.5)

## 2025-02-22 PROCEDURE — 25000003 PHARM REV CODE 250: Performed by: INTERNAL MEDICINE

## 2025-02-22 PROCEDURE — 25500020 PHARM REV CODE 255: Performed by: STUDENT IN AN ORGANIZED HEALTH CARE EDUCATION/TRAINING PROGRAM

## 2025-02-22 PROCEDURE — 80061 LIPID PANEL: CPT

## 2025-02-22 PROCEDURE — 80053 COMPREHEN METABOLIC PANEL: CPT

## 2025-02-22 PROCEDURE — 84443 ASSAY THYROID STIM HORMONE: CPT

## 2025-02-22 PROCEDURE — 25000003 PHARM REV CODE 250: Performed by: STUDENT IN AN ORGANIZED HEALTH CARE EDUCATION/TRAINING PROGRAM

## 2025-02-22 PROCEDURE — 99285 EMERGENCY DEPT VISIT HI MDM: CPT | Mod: 25

## 2025-02-22 PROCEDURE — 21400001 HC TELEMETRY ROOM

## 2025-02-22 PROCEDURE — 11000001 HC ACUTE MED/SURG PRIVATE ROOM

## 2025-02-22 PROCEDURE — 93010 ELECTROCARDIOGRAM REPORT: CPT | Mod: ,,, | Performed by: INTERNAL MEDICINE

## 2025-02-22 PROCEDURE — 93005 ELECTROCARDIOGRAM TRACING: CPT

## 2025-02-22 PROCEDURE — A9577 INJ MULTIHANCE: HCPCS | Performed by: STUDENT IN AN ORGANIZED HEALTH CARE EDUCATION/TRAINING PROGRAM

## 2025-02-22 PROCEDURE — 85610 PROTHROMBIN TIME: CPT

## 2025-02-22 PROCEDURE — 84484 ASSAY OF TROPONIN QUANT: CPT

## 2025-02-22 PROCEDURE — 85025 COMPLETE CBC W/AUTO DIFF WBC: CPT

## 2025-02-22 RX ORDER — ASPIRIN 325 MG
325 TABLET, DELAYED RELEASE (ENTERIC COATED) ORAL
Status: COMPLETED | OUTPATIENT
Start: 2025-02-22 | End: 2025-02-22

## 2025-02-22 RX ORDER — ONDANSETRON HYDROCHLORIDE 2 MG/ML
4 INJECTION, SOLUTION INTRAVENOUS EVERY 8 HOURS PRN
Status: DISCONTINUED | OUTPATIENT
Start: 2025-02-22 | End: 2025-02-25 | Stop reason: HOSPADM

## 2025-02-22 RX ORDER — ACETAMINOPHEN 325 MG/1
650 TABLET ORAL EVERY 6 HOURS PRN
Status: DISCONTINUED | OUTPATIENT
Start: 2025-02-22 | End: 2025-02-25 | Stop reason: HOSPADM

## 2025-02-22 RX ORDER — SODIUM CHLORIDE 0.9 % (FLUSH) 0.9 %
10 SYRINGE (ML) INJECTION
Status: DISCONTINUED | OUTPATIENT
Start: 2025-02-22 | End: 2025-02-25 | Stop reason: HOSPADM

## 2025-02-22 RX ORDER — FAMOTIDINE 20 MG/1
20 TABLET, FILM COATED ORAL 2 TIMES DAILY
Status: DISCONTINUED | OUTPATIENT
Start: 2025-02-22 | End: 2025-02-25 | Stop reason: HOSPADM

## 2025-02-22 RX ORDER — BUTALBITAL, ACETAMINOPHEN AND CAFFEINE 50; 325; 40 MG/1; MG/1; MG/1
1 TABLET ORAL EVERY 6 HOURS PRN
Status: DISCONTINUED | OUTPATIENT
Start: 2025-02-22 | End: 2025-02-25 | Stop reason: HOSPADM

## 2025-02-22 RX ORDER — TALC
6 POWDER (GRAM) TOPICAL NIGHTLY PRN
Status: DISCONTINUED | OUTPATIENT
Start: 2025-02-22 | End: 2025-02-25 | Stop reason: HOSPADM

## 2025-02-22 RX ADMIN — ASPIRIN 325 MG: 325 TABLET, COATED ORAL at 07:02

## 2025-02-22 RX ADMIN — BUTALBITAL, ACETAMINOPHEN, AND CAFFEINE 1 TABLET: 50; 325; 40 TABLET ORAL at 09:02

## 2025-02-22 RX ADMIN — GADOBENATE DIMEGLUMINE 20 ML: 529 INJECTION, SOLUTION INTRAVENOUS at 08:02

## 2025-02-22 RX ADMIN — IOHEXOL 100 ML: 350 INJECTION, SOLUTION INTRAVENOUS at 05:02

## 2025-02-22 RX ADMIN — Medication 6 MG: at 11:02

## 2025-02-22 RX ADMIN — FAMOTIDINE 20 MG: 20 TABLET, FILM COATED ORAL at 09:02

## 2025-02-22 NOTE — ED PROVIDER NOTES
Encounter Date: 2/22/2025    SCRIBE #1 NOTE: I, Manny Cortes, am scribing for, and in the presence of,  Artis Gay MD. I have scribed the following portions of the note - Other sections scribed: HPI, ROS, PE.       History     Chief Complaint   Patient presents with    Dizziness     Patient reports left sided body numbness/off balance swaying to the left. Symptoms started at 12pm. Cbg 129 in triage     Patient is a 68 y/o male with a hx of a-fib, CAD, HTN, HLD, and prostate cancer s/p CABG who presents to the ED for left sided numbness beginning today at 12:00. Pt states today at around noon he began experiencing left face, left arm, and left leg numbness/tingling as well as a left leaning gait. Pt also notes a left, posterior headache that was not relieved with PO tylenol or ibuprofen. Pt denies being on anticoagulates.     Cardiologist: Dr. Bg Gill MD    The history is provided by the patient and medical records. No  was used.     Review of patient's allergies indicates:  No Known Allergies  Past Medical History:   Diagnosis Date    Hypertension      Past Surgical History:   Procedure Laterality Date    CARDIAC SURGERY      KNEE ARTHROSCOPY Left      Family History   Problem Relation Name Age of Onset    Heart disease Father Jacob     Hypertension Father Jacob      Social History[1]  Review of Systems   Constitutional:  Negative for chills and fever.   HENT:  Negative for drooling and sore throat.    Eyes:  Negative for pain and redness.   Respiratory:  Negative for shortness of breath, wheezing and stridor.    Cardiovascular:  Negative for chest pain, palpitations and leg swelling.   Gastrointestinal:  Negative for abdominal pain, nausea and vomiting.   Genitourinary:  Negative for dysuria and hematuria.   Musculoskeletal:  Positive for gait problem. Negative for back pain, neck pain and neck stiffness.   Skin:  Negative for rash and wound.   Neurological:  Positive for numbness  and headaches.   Hematological:  Does not bruise/bleed easily.       Physical Exam     Initial Vitals [02/22/25 1712]   BP Pulse Resp Temp SpO2   (!) 161/99 67 20 97.5 °F (36.4 °C) 99 %      MAP       --         Physical Exam    Nursing note and vitals reviewed.  Constitutional: He appears well-developed and well-nourished. He is not diaphoretic. No distress.   Well appearing   HENT:   Head: Normocephalic and atraumatic.   Eyes: EOM are normal. Pupils are equal, round, and reactive to light.   Cardiovascular:  Normal rate and regular rhythm.           No murmur heard.  Pulmonary/Chest: Breath sounds normal. No respiratory distress. He has no wheezes. He has no rales.   Well healed sternotomy scar   Abdominal: Abdomen is soft. He exhibits no distension. There is no abdominal tenderness.   Musculoskeletal:         General: Normal range of motion.     Neurological: He is alert and oriented to person, place, and time. He has normal strength. No cranial nerve deficit or sensory deficit.   Decreased strength to the left upper extremity compared to the right. Sensations intact to light touch to all extremities. Cranial nerves 2-12 grossly intact. No dysarthria. No facial asymmetry.    Skin: Skin is warm. Capillary refill takes less than 2 seconds. No rash noted.   Psychiatric: He has a normal mood and affect.         ED Course   Procedures  Labs Reviewed   POCT GLUCOSE - Abnormal       Result Value    POCT Glucose 129 (*)    CBC W/ AUTO DIFFERENTIAL    Narrative:     The following orders were created for panel order CBC W/ AUTO DIFFERENTIAL.  Procedure                               Abnormality         Status                     ---------                               -----------         ------                     CBC with Differential[[id number]]                                                        Please view results for these tests on the individual orders.   COMPREHENSIVE METABOLIC PANEL   PROTIME-INR   TSH   LIPID  PANEL   TROPONIN I   CBC WITH DIFFERENTIAL   POCT GLUCOSE, HAND-HELD DEVICE          Imaging Results              CTA Head and Neck (xpd) (In process)                      CT HEAD FOR STROKE (In process)                      Medications - No data to display  Medical Decision Making  Amount and/or Complexity of Data Reviewed  Radiology: ordered.    Differential diagnosis (includes but is not limited to):   ***    MDM Narrative  ***    Update: ***    Dispo: ***    My independent radiology interpretation: ***  Point of care US (independently performed and interpreted): ***  Decision rules/clinical scoring: ***    Sepsis Perfusion Assessment: ***    Amount and/or Complexity of Data Reviewed  Independent historian: {MDMINDEPENDENTHISTORIAN:78126}   Summary of history: ***  External data reviewed: {[facility]}  Summary of data reviewed: ***  Risk and benefits of testing: discussed   Labs: ordered and reviewed  Radiology: ordered and independent interpretation performed (see above or ED course)  ECG/medicine tests: ordered and independent interpretation performed (see above or ED course)  Discussion of management or test interpretation with external provider(s): {MDMEXTERNALPROVIDER:90697}   Summary of discussion: ***    Risk  {MDMRISK:97631}    Critical Care  {MDMCRITICALCARE:79966}    Data Reviewed/Counseling: I have personally reviewed the patient's vital signs, nursing notes, and other relevant tests, information, and imaging. I had a detailed discussion regarding the historical points, exam findings, and any diagnostic results supporting the discharge diagnosis. I personally performed the history, PE, MDM and procedures as documented above and agree with the scribe's documentation.    Portions of this note were dictated using voice recognition software. Although it was reviewed for accuracy, some inherent voice recognition errors may have occurred and may be present in this document.          Bharatibe  Attestation:   Scribe #1: I performed the above scribed service and the documentation accurately describes the services I performed. I attest to the accuracy of the note.    Attending Attestation:           Physician Attestation for Scribe:  Physician Attestation Statement for Scribe #1: I, Artis Gay MD, reviewed documentation, as scribed by Manny Cortes in my presence, and it is both accurate and complete.                                    Clinical Impression:  Final diagnoses:  [R29.818] Acute focal neurological deficit  [R42] Dysequilibrium                   [1]   Social History  Tobacco Use    Smoking status: Never    Smokeless tobacco: Never   Substance Use Topics    Alcohol use: Yes    Drug use: Never

## 2025-02-23 PROBLEM — R20.0 NUMBNESS AND TINGLING: Status: ACTIVE | Noted: 2025-02-23

## 2025-02-23 PROBLEM — R20.2 NUMBNESS AND TINGLING: Status: ACTIVE | Noted: 2025-02-23

## 2025-02-23 PROBLEM — R29.818 TRANSIENT NEUROLOGICAL SYMPTOMS: Status: ACTIVE | Noted: 2025-02-23

## 2025-02-23 LAB
ALBUMIN SERPL-MCNC: 3.8 G/DL (ref 3.4–4.8)
ALBUMIN/GLOB SERPL: 1.5 RATIO (ref 1.1–2)
ALP SERPL-CCNC: 76 UNIT/L (ref 40–150)
ALT SERPL-CCNC: 25 UNIT/L (ref 0–55)
ANION GAP SERPL CALC-SCNC: 10 MEQ/L
AST SERPL-CCNC: 19 UNIT/L (ref 5–34)
BASOPHILS # BLD AUTO: 0.03 X10(3)/MCL
BASOPHILS NFR BLD AUTO: 0.5 %
BILIRUB SERPL-MCNC: 0.6 MG/DL
BUN SERPL-MCNC: 17.8 MG/DL (ref 8.4–25.7)
CALCIUM SERPL-MCNC: 9.3 MG/DL (ref 8.8–10)
CHLORIDE SERPL-SCNC: 101 MMOL/L (ref 98–107)
CO2 SERPL-SCNC: 24 MMOL/L (ref 23–31)
CREAT SERPL-MCNC: 0.89 MG/DL (ref 0.72–1.25)
CREAT/UREA NIT SERPL: 20
EOSINOPHIL # BLD AUTO: 0.04 X10(3)/MCL (ref 0–0.9)
EOSINOPHIL NFR BLD AUTO: 0.6 %
ERYTHROCYTE [DISTWIDTH] IN BLOOD BY AUTOMATED COUNT: 11.9 % (ref 11.5–17)
GFR SERPLBLD CREATININE-BSD FMLA CKD-EPI: >60 ML/MIN/1.73/M2
GLOBULIN SER-MCNC: 2.6 GM/DL (ref 2.4–3.5)
GLUCOSE SERPL-MCNC: 110 MG/DL (ref 82–115)
HCT VFR BLD AUTO: 45.7 % (ref 42–52)
HGB BLD-MCNC: 15.4 G/DL (ref 14–18)
IMM GRANULOCYTES # BLD AUTO: 0.02 X10(3)/MCL (ref 0–0.04)
IMM GRANULOCYTES NFR BLD AUTO: 0.3 %
LYMPHOCYTES # BLD AUTO: 0.82 X10(3)/MCL (ref 0.6–4.6)
LYMPHOCYTES NFR BLD AUTO: 12.4 %
MCH RBC QN AUTO: 30.6 PG (ref 27–31)
MCHC RBC AUTO-ENTMCNC: 33.7 G/DL (ref 33–36)
MCV RBC AUTO: 90.7 FL (ref 80–94)
MONOCYTES # BLD AUTO: 0.48 X10(3)/MCL (ref 0.1–1.3)
MONOCYTES NFR BLD AUTO: 7.2 %
NEUTROPHILS # BLD AUTO: 5.24 X10(3)/MCL (ref 2.1–9.2)
NEUTROPHILS NFR BLD AUTO: 79 %
NRBC BLD AUTO-RTO: 0 %
OHS QRS DURATION: 88 MS
OHS QTC CALCULATION: 422 MS
PLATELET # BLD AUTO: 182 X10(3)/MCL (ref 130–400)
PMV BLD AUTO: 9.8 FL (ref 7.4–10.4)
POCT GLUCOSE: 110 MG/DL (ref 70–110)
POCT GLUCOSE: 116 MG/DL (ref 70–110)
POTASSIUM SERPL-SCNC: 4.1 MMOL/L (ref 3.5–5.1)
PROT SERPL-MCNC: 6.4 GM/DL (ref 5.8–7.6)
RBC # BLD AUTO: 5.04 X10(6)/MCL (ref 4.7–6.1)
SODIUM SERPL-SCNC: 135 MMOL/L (ref 136–145)
WBC # BLD AUTO: 6.63 X10(3)/MCL (ref 4.5–11.5)

## 2025-02-23 PROCEDURE — 80053 COMPREHEN METABOLIC PANEL: CPT | Performed by: STUDENT IN AN ORGANIZED HEALTH CARE EDUCATION/TRAINING PROGRAM

## 2025-02-23 PROCEDURE — 25000003 PHARM REV CODE 250: Performed by: INTERNAL MEDICINE

## 2025-02-23 PROCEDURE — 25000003 PHARM REV CODE 250: Performed by: STUDENT IN AN ORGANIZED HEALTH CARE EDUCATION/TRAINING PROGRAM

## 2025-02-23 PROCEDURE — 85025 COMPLETE CBC W/AUTO DIFF WBC: CPT | Performed by: STUDENT IN AN ORGANIZED HEALTH CARE EDUCATION/TRAINING PROGRAM

## 2025-02-23 PROCEDURE — 21400001 HC TELEMETRY ROOM

## 2025-02-23 PROCEDURE — 36415 COLL VENOUS BLD VENIPUNCTURE: CPT | Performed by: STUDENT IN AN ORGANIZED HEALTH CARE EDUCATION/TRAINING PROGRAM

## 2025-02-23 PROCEDURE — 99223 1ST HOSP IP/OBS HIGH 75: CPT | Mod: AI,,, | Performed by: INTERNAL MEDICINE

## 2025-02-23 RX ORDER — ATORVASTATIN CALCIUM 40 MG/1
40 TABLET, FILM COATED ORAL NIGHTLY
Status: DISCONTINUED | OUTPATIENT
Start: 2025-02-23 | End: 2025-02-23

## 2025-02-23 RX ORDER — ASPIRIN 81 MG/1
81 TABLET ORAL DAILY
Status: DISCONTINUED | OUTPATIENT
Start: 2025-02-23 | End: 2025-02-23

## 2025-02-23 RX ORDER — HYDROCHLOROTHIAZIDE 12.5 MG/1
12.5 TABLET ORAL DAILY
Status: DISCONTINUED | OUTPATIENT
Start: 2025-02-23 | End: 2025-02-25 | Stop reason: HOSPADM

## 2025-02-23 RX ORDER — SULFAMETHOXAZOLE AND TRIMETHOPRIM 800; 160 MG/1; MG/1
1 TABLET ORAL 2 TIMES DAILY
Status: DISCONTINUED | OUTPATIENT
Start: 2025-02-23 | End: 2025-02-25 | Stop reason: HOSPADM

## 2025-02-23 RX ORDER — ATORVASTATIN CALCIUM 10 MG/1
20 TABLET, FILM COATED ORAL DAILY
Status: DISCONTINUED | OUTPATIENT
Start: 2025-02-23 | End: 2025-02-25 | Stop reason: HOSPADM

## 2025-02-23 RX ORDER — TIZANIDINE 4 MG/1
4 TABLET ORAL 2 TIMES DAILY PRN
Status: DISCONTINUED | OUTPATIENT
Start: 2025-02-23 | End: 2025-02-25 | Stop reason: HOSPADM

## 2025-02-23 RX ORDER — METOPROLOL SUCCINATE 50 MG/1
100 TABLET, EXTENDED RELEASE ORAL DAILY
Status: DISCONTINUED | OUTPATIENT
Start: 2025-02-23 | End: 2025-02-25 | Stop reason: HOSPADM

## 2025-02-23 RX ORDER — TAMSULOSIN HYDROCHLORIDE 0.4 MG/1
0.4 CAPSULE ORAL DAILY
Status: DISCONTINUED | OUTPATIENT
Start: 2025-02-23 | End: 2025-02-25 | Stop reason: HOSPADM

## 2025-02-23 RX ORDER — NAPROXEN SODIUM 220 MG/1
81 TABLET, FILM COATED ORAL ONCE
Status: DISCONTINUED | OUTPATIENT
Start: 2025-02-23 | End: 2025-02-25 | Stop reason: HOSPADM

## 2025-02-23 RX ORDER — LISINOPRIL 20 MG/1
20 TABLET ORAL DAILY
Status: DISCONTINUED | OUTPATIENT
Start: 2025-02-23 | End: 2025-02-25 | Stop reason: HOSPADM

## 2025-02-23 RX ORDER — BENAZEPRIL HYDROCHLORIDE AND HYDROCHLOROTHIAZIDE 20; 12.5 MG/1; MG/1
1 TABLET ORAL DAILY
Status: DISCONTINUED | OUTPATIENT
Start: 2025-02-23 | End: 2025-02-23 | Stop reason: CLARIF

## 2025-02-23 RX ADMIN — ATORVASTATIN CALCIUM 20 MG: 10 TABLET, FILM COATED ORAL at 01:02

## 2025-02-23 RX ADMIN — TAMSULOSIN HYDROCHLORIDE 0.4 MG: 0.4 CAPSULE ORAL at 01:02

## 2025-02-23 RX ADMIN — FAMOTIDINE 20 MG: 20 TABLET, FILM COATED ORAL at 09:02

## 2025-02-23 RX ADMIN — HYDROCHLOROTHIAZIDE 12.5 MG: 12.5 TABLET ORAL at 01:02

## 2025-02-23 RX ADMIN — SULFAMETHOXAZOLE AND TRIMETHOPRIM 1 TABLET: 800; 160 TABLET ORAL at 09:02

## 2025-02-23 RX ADMIN — Medication 6 MG: at 09:02

## 2025-02-23 RX ADMIN — ASPIRIN 81 MG: 81 TABLET, COATED ORAL at 11:02

## 2025-02-23 RX ADMIN — BUTALBITAL, ACETAMINOPHEN, AND CAFFEINE 1 TABLET: 50; 325; 40 TABLET ORAL at 09:02

## 2025-02-23 RX ADMIN — WHITE PETROLATUM 57.7 %-MINERAL OIL 31.9 % EYE OINTMENT: at 09:02

## 2025-02-23 RX ADMIN — TIZANIDINE 4 MG: 4 TABLET ORAL at 01:02

## 2025-02-23 RX ADMIN — LISINOPRIL 20 MG: 20 TABLET ORAL at 01:02

## 2025-02-23 RX ADMIN — METOPROLOL SUCCINATE 100 MG: 50 TABLET, EXTENDED RELEASE ORAL at 01:02

## 2025-02-23 RX ADMIN — TIZANIDINE 4 MG: 4 TABLET ORAL at 09:02

## 2025-02-23 NOTE — SUBJECTIVE & OBJECTIVE
Past Medical History:   Diagnosis Date    A-fib     Hypertension        Past Surgical History:   Procedure Laterality Date    CARDIAC SURGERY      INCISION AND DRAINAGE OF CHEST Left     KNEE ARTHROSCOPY Left        Review of patient's allergies indicates:  No Known Allergies    No current facility-administered medications on file prior to encounter.     Current Outpatient Medications on File Prior to Encounter   Medication Sig    alfuzosin (UROXATRAL) 10 mg Tb24 Take 10 mg by mouth daily with breakfast.    aspirin 81 MG Chew 1 tablet once.    benazepril-hydrochlorthiazide (LOTENSIN HCT) 20-12.5 mg per tablet Take 0.5 tablets by mouth.    bisoprolol (ZEBETA) 5 MG tablet Take 2.5 mg by mouth once daily.    rosuvastatin (CRESTOR) 5 MG tablet Take 5 mg by mouth.    sulfamethoxazole-trimethoprim 800-160mg (BACTRIM DS) 800-160 mg Tab Take 1 tablet by mouth 2 (two) times daily.    valACYclovir (VALTREX) 1000 MG tablet Take 1 tablet (1,000 mg total) by mouth once daily. (Patient not taking: Reported on 1/31/2025)     Family History       Problem Relation (Age of Onset)    Heart disease Father    Hypertension Father          Tobacco Use    Smoking status: Never    Smokeless tobacco: Never   Substance and Sexual Activity    Alcohol use: Yes     Comment: about 2 beers a week    Drug use: Never    Sexual activity: Yes     Partners: Female     Review of Systems   Constitutional: Negative.    HENT: Negative.     Eyes: Negative.    Respiratory:  Negative for cough, chest tightness and shortness of breath.    Cardiovascular:  Negative for chest pain and leg swelling.   Gastrointestinal:  Negative for abdominal pain, diarrhea, nausea and vomiting.   Endocrine: Negative.    Genitourinary: Negative.    Musculoskeletal: Negative.    Skin: Negative.    Allergic/Immunologic: Negative.    Neurological:  Positive for weakness and numbness. Negative for headaches.   Hematological: Negative.    Psychiatric/Behavioral: Negative.        Objective:     Vital Signs (Most Recent):  Temp: 97.9 °F (36.6 °C) (02/23/25 1033)  Pulse: 64 (02/23/25 1350)  Resp: 11 (02/22/25 1930)  BP: (!) 146/75 (02/23/25 1350)  SpO2: 96 % (02/23/25 1033) Vital Signs (24h Range):  Temp:  [97.5 °F (36.4 °C)-97.9 °F (36.6 °C)] 97.9 °F (36.6 °C)  Pulse:  [57-67] 64  Resp:  [8-21] 11  SpO2:  [95 %-100 %] 96 %  BP: (133-166)/(75-99) 146/75     Weight: 101.6 kg (224 lb)  Body mass index is 30.38 kg/m².     Physical Exam  Eyes:      Pupils: Pupils are equal, round, and reactive to light.   Cardiovascular:      Rate and Rhythm: Normal rate.      Pulses: Normal pulses.      Heart sounds: No murmur heard.  Pulmonary:      Effort: Pulmonary effort is normal.      Breath sounds: Normal breath sounds.   Abdominal:      General: Abdomen is flat. Bowel sounds are normal. There is no distension.      Palpations: Abdomen is soft.      Tenderness: There is no guarding.   Musculoskeletal:         General: Normal range of motion.      Cervical back: Normal range of motion and neck supple.   Skin:     General: Skin is warm.   Neurological:      General: No focal deficit present.      Mental Status: He is alert.   Psychiatric:         Mood and Affect: Mood normal.         Behavior: Behavior normal.              CRANIAL NERVES     CN III, IV, VI   Pupils are equal, round, and reactive to light.       Significant Labs: All pertinent labs within the past 24 hours have been reviewed.  Recent Lab Results  (Last 5 results in the past 24 hours)        02/23/25  1106   02/23/25  1027   02/22/25  1732   02/22/25  1725   02/22/25  1707        Albumin/Globulin Ratio   1.5   1.3           Albumin   3.8   4.0           ALP   76   77           ALT   25   35           Anion Gap   10.0   14.0           AST   19   22           Baso #   0.03   0.04           Basophil %   0.5   0.7           BILIRUBIN TOTAL   0.6   0.4           BUN   17.8   20.1           BUN/CREAT RATIO   20   17           Calcium   9.3    9.7           Chloride   101   100           Cholesterol Total     150           CO2   24   23           Creatinine   0.89   1.15           eGFR   >60  Comment: Estimated GFR calculated using the CKD-EPI creatinine (2021) equation.   >60  Comment: Estimated GFR calculated using the CKD-EPI creatinine (2021) equation.           Eos #   0.04   0.11           Eos %   0.6   1.9           Globulin, Total   2.6   3.2           Glucose   110   127           HDL     43           Hematocrit   45.7   47.4           Hemoglobin   15.4   16.2           Immature Grans (Abs)   0.02   0.01           Immature Granulocytes   0.3   0.2           INR     1.0           LDL Cholesterol     72.00  Comment: LDL calculated using the Friedewald equation.           Lymph #   0.82   1.26           LYMPH %   12.4   21.9           MCH   30.6   31.0           MCHC   33.7   34.2           MCV   90.7   90.6           Mono #   0.48   0.42           Mono %   7.2   7.3           MPV   9.8   9.9           Neut #   5.24   3.91           Neut %   79.0   68.0           nRBC   0.0   0.0           QRS Duration               [facility] QTC Calculation               Platelet Count   182   201           POC Anion Gap       18         POC BUN       22         POC Chloride       99         POC Creatinine       1.2         POC Glucose       128         POC Hematocrit       48         POC HEMOGLOBIN       16         POC Ionized Calcium       1.28         POC Potassium       4.2         POC Sodium       138         POC TCO2 (MEASURED)       25         POCT Glucose 116         129       Potassium   4.1   4.2           PROTEIN TOTAL   6.4   7.2           PT     12.7           RBC   5.04   5.23           RDW   11.9   11.8           Sample       VENOUS         Sodium   135   137           Total Cholesterol/HDL Ratio     3           Triglycerides     177           Troponin I     <0.010           TSH     3.138           Very Low Density Lipoprotein     35           WBC   6.63    5.75                                  Significant Imaging: I have reviewed all pertinent imaging results/findings within the past 24 hours.
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
## 2025-02-23 NOTE — HPI
Laz is a 69-year-old gentleman patient of Dr. De La Fuente with a past medical history of coronary disease hypertension presented to the emergency room with left-sided facial numbness and off-balance swaying to the left.  His off-balance is not recent however the left side of his face is new that began this morning he is out of the tPA window CT of the head was negative stroke workup was negative MRI was negative.  Seen by Neurology.  This morning he is still has left-sided facial numbness and also point tenderness along the mastoid in the left radiating downward.  Normally Fioricet helps this however is not helping today.  Is willing to try muscle relaxer.  MRI was negative neurology recommended if symptoms persist maybe repeat another MRI.  Otherwise hemodynamically he is stable being admitted for stroke workup however stroke workup has been negative so far.

## 2025-02-23 NOTE — ASSESSMENT & PLAN NOTE
Stroke workup has been negative so far MRI was negative.  Neurology recommended if he is still symptomatic repeat MRI tomorrow.  He is still having left-sided facial numbness no drooping no slurred speech.  He does have some point tenderness in the on the left mastoid at the insertion of the trapezius that has reproducible.  Will try mild tizanidine.  He states normally Fioricet helps however did not help this time  Neurology has signed off.  Continue aspirin statin.  He did have a brief episode of paroxysmal atrial flutter after his bypass however he has been in normal sinus rhythm since.  He is not on any oral anticoagulation.  Physical therapy evaluation

## 2025-02-23 NOTE — HPI
Ahmet Edmonds is a 69 y.o. male with past medical history of PAF (no anticoagulation), CAD s/p CABG, HTN, HLD, and Prostate Cancer who presented to the ER with complaints of left sided numbness. Symptoms began at 12:00 noon on 2/22/2025. Patient reported left face, arm, and leg numbness and tingling, along with a left leaning gait. He also endorsed left posterior HA that was not relieved with Tylenol. CT head showed left frontal lobe periventricular hypo-attenuation that may represent subacute non hemorrhagic infarct. CTA head and neck without LVO or flow limiting stenosis. MRI Brain showed no acute intracranial findings, but did show left frontal lobe periventricular encephalomalacia combined with gliotic changes. AFVSS. Without Leukocytosis. UA not done. Home medications include low dose aspirin and rosuvastatin. Patient was admitted to Hospitalist's and Neurology consulted for further recommendations.      Initial Vitals [02/22/25 1712]   BP Pulse Resp Temp SpO2   (!) 161/99 67 20 97.5 °F (36.4 °C) 99 %

## 2025-02-23 NOTE — ASSESSMENT & PLAN NOTE
- presented with left sided numbness/tingling, Left posterior HA, left leaning gait  - Stroke RF: CAD, HLD, HTN  - Intervention: Hypo attenuation on CT head, no TNK  - Etiology: TBD    Stroke workup:  -CTh: Left frontal lobe periventricular hypoattenuation may represent subacute nonhemorrhagic infarct   -CTA h/n: Left proximal internal carotid artery calcified plaque causing up to 42% stenosis   -MRI brain: 1.  No acute intracranial findings identified.   2.  Left frontal lobe periventricular encephalomalacia combined with gliotic changes.   -ECHO:    -LDL: 72.00   -TSH: 3.138   -home medications include: aspirin 81 mg, rosuvastatin 5 mg  NIH Stroke Scale      1a  Level of consciousness: 0=alert; keenly responsive   1b. LOC questions:  0=Answers both tasks correctly   1c. LOC commands: 0=Answers both tasks correctly   2.  Best Gaze: 0=normal   3.  Visual: 0=No visual loss   4. Facial Palsy: 0=Normal symmetric movement   5a.  Motor left arm: 0=No drift, limb holds 90 (or 45) degrees for full 10 seconds   5b.  Motor right arm: 0=No drift, limb holds 90 (or 45) degrees for full 10 seconds   6a. motor left le=No drift, limb holds 90 (or 45) degrees for full 10 seconds   6b  Motor right le=No drift, limb holds 90 (or 45) degrees for full 10 seconds   7. Limb Ataxia: 0=Absent   8.  Sensory: 0=Normal; no sensory loss   9. Best Language:  0=No aphasia, normal   10. Dysarthria: 0=Normal   11. Extinction and Inattention: 0=No abnormality   12. Distal motor function: 0=Normal    Total:   0         Plan:  -continue stroke workup  -continue Aspirin 81mg daily  -continue Atorvastatin 40mg daily  -PT/OT/ST to evaluate  -SCD for DVT prophylaxis   - Allow permissive HTN ... prn hydralazine and labetalol for SBP > 220 or DBP > 120     - after 24 hours from symptom onset, ok to normalize blood pressure  - Neuro checks q4hr ... stat CTh if any neuro change   - Continuous telemetry monitoring  - NPO until passes Pedro or  cleared by SLP  - No bedrest, ok for patient to ambulate, RN to observe first ambulation for safety

## 2025-02-23 NOTE — ASSESSMENT & PLAN NOTE
Patient has paroxysmal (<7 days) atrial fibrillation. Patient is currently in sinus rhythm. [id number] Score: 2. The patients heart rate in the last 24 hours is as follows:  Pulse  Min: 57  Max: 67     Antiarrhythmics  metoprolol succinate (TOPROL-XL) 24 hr tablet 100 mg, Daily, Oral    Anticoagulants       Plan  - Replete lytes with a goal of K>4, Mg >2  - Patient is not anticoagulated due to be in normal sinus rhythm is followed by his cardiologist  - Patient's afib is currently  not in AFib, this only happened briefly after his CABG  -

## 2025-02-23 NOTE — H&P
[facility] Medicine  History & Physical    Patient Name: Ahmet Edmonds  MRN: [id number]  Patient Class: IP- Inpatient  Admission Date: 2/22/2025  Attending Physician: Houston De La Fuente MD   Primary Care Provider: Houston De La Fuente MD         Patient information was obtained from patient and ER records.     Subjective:     Principal Problem:Numbness and tingling    Chief Complaint:   Chief Complaint   Patient presents with    Dizziness     Patient reports left sided body numbness/off balance swaying to the left. Symptoms started at 12pm. Cbg 129 in triage        HPI: Laz is a 69-year-old gentleman patient of Dr. De La Fuente with a past medical history of coronary disease hypertension presented to the emergency room with left-sided facial numbness and off-balance swaying to the left.  His off-balance is not recent however the left side of his face is new that began this morning he is out of the tPA window CT of the head was negative stroke workup was negative MRI was negative.  Seen by Neurology.  This morning he is still has left-sided facial numbness and also point tenderness along the mastoid in the left radiating downward.  Normally Fioricet helps this however is not helping today.  Is willing to try muscle relaxer.  MRI was negative neurology recommended if symptoms persist maybe repeat another MRI.  Otherwise hemodynamically he is stable being admitted for stroke workup however stroke workup has been negative so far.    Past Medical History:   Diagnosis Date    A-fib     Hypertension        Past Surgical History:   Procedure Laterality Date    CARDIAC SURGERY      INCISION AND DRAINAGE OF CHEST Left     KNEE ARTHROSCOPY Left        Review of patient's allergies indicates:  No Known Allergies    No current facility-administered medications on file prior to encounter.     Current Outpatient Medications on File Prior to Encounter   Medication Sig    alfuzosin (UROXATRAL) 10 mg Tb24  Take 10 mg by mouth daily with breakfast.    aspirin 81 MG Chew 1 tablet once.    benazepril-hydrochlorthiazide (LOTENSIN HCT) 20-12.5 mg per tablet Take 0.5 tablets by mouth.    bisoprolol (ZEBETA) 5 MG tablet Take 2.5 mg by mouth once daily.    rosuvastatin (CRESTOR) 5 MG tablet Take 5 mg by mouth.    sulfamethoxazole-trimethoprim 800-160mg (BACTRIM DS) 800-160 mg Tab Take 1 tablet by mouth 2 (two) times daily.    valACYclovir (VALTREX) 1000 MG tablet Take 1 tablet (1,000 mg total) by mouth once daily. (Patient not taking: Reported on 1/31/2025)     Family History       Problem Relation (Age of Onset)    Heart disease Father    Hypertension Father          Tobacco Use    Smoking status: Never    Smokeless tobacco: Never   Substance and Sexual Activity    Alcohol use: Yes     Comment: about 2 beers a week    Drug use: Never    Sexual activity: Yes     Partners: Female     Review of Systems   Constitutional: Negative.    HENT: Negative.     Eyes: Negative.    Respiratory:  Negative for cough, chest tightness and shortness of breath.    Cardiovascular:  Negative for chest pain and leg swelling.   Gastrointestinal:  Negative for abdominal pain, diarrhea, nausea and vomiting.   Endocrine: Negative.    Genitourinary: Negative.    Musculoskeletal: Negative.    Skin: Negative.    Allergic/Immunologic: Negative.    Neurological:  Positive for weakness and numbness. Negative for headaches.   Hematological: Negative.    Psychiatric/Behavioral: Negative.       Objective:     Vital Signs (Most Recent):  Temp: 97.9 °F (36.6 °C) (02/23/25 1033)  Pulse: 64 (02/23/25 1350)  Resp: 11 (02/22/25 1930)  BP: (!) 146/75 (02/23/25 1350)  SpO2: 96 % (02/23/25 1033) Vital Signs (24h Range):  Temp:  [97.5 °F (36.4 °C)-97.9 °F (36.6 °C)] 97.9 °F (36.6 °C)  Pulse:  [57-67] 64  Resp:  [8-21] 11  SpO2:  [95 %-100 %] 96 %  BP: (133-166)/(75-99) 146/75     Weight: 101.6 kg (224 lb)  Body mass index is 30.38 kg/m².     Physical Exam  Eyes:       Pupils: Pupils are equal, round, and reactive to light.   Cardiovascular:      Rate and Rhythm: Normal rate.      Pulses: Normal pulses.      Heart sounds: No murmur heard.  Pulmonary:      Effort: Pulmonary effort is normal.      Breath sounds: Normal breath sounds.   Abdominal:      General: Abdomen is flat. Bowel sounds are normal. There is no distension.      Palpations: Abdomen is soft.      Tenderness: There is no guarding.   Musculoskeletal:         General: Normal range of motion.      Cervical back: Normal range of motion and neck supple.   Skin:     General: Skin is warm.   Neurological:      General: No focal deficit present.      Mental Status: He is alert.   Psychiatric:         Mood and Affect: Mood normal.         Behavior: Behavior normal.              CRANIAL NERVES     CN III, IV, VI   Pupils are equal, round, and reactive to light.       Significant Labs: All pertinent labs within the past 24 hours have been reviewed.  Recent Lab Results  (Last 5 results in the past 24 hours)        02/23/25  1106   02/23/25  1027   02/22/25  1732   02/22/25  1725   02/22/25  1707        Albumin/Globulin Ratio   1.5   1.3           Albumin   3.8   4.0           ALP   76   77           ALT   25   35           Anion Gap   10.0   14.0           AST   19   22           Baso #   0.03   0.04           Basophil %   0.5   0.7           BILIRUBIN TOTAL   0.6   0.4           BUN   17.8   20.1           BUN/CREAT RATIO   20   17           Calcium   9.3   9.7           Chloride   101   100           Cholesterol Total     150           CO2   24   23           Creatinine   0.89   1.15           eGFR   >60  Comment: Estimated GFR calculated using the CKD-EPI creatinine (2021) equation.   >60  Comment: Estimated GFR calculated using the CKD-EPI creatinine (2021) equation.           Eos #   0.04   0.11           Eos %   0.6   1.9           Globulin, Total   2.6   3.2           Glucose   110   127           HDL     43            Hematocrit   45.7   47.4           Hemoglobin   15.4   16.2           Immature Grans (Abs)   0.02   0.01           Immature Granulocytes   0.3   0.2           INR     1.0           LDL Cholesterol     72.00  Comment: LDL calculated using the Friedewald equation.           Lymph #   0.82   1.26           LYMPH %   12.4   21.9           MCH   30.6   31.0           MCHC   33.7   34.2           MCV   90.7   90.6           Mono #   0.48   0.42           Mono %   7.2   7.3           MPV   9.8   9.9           Neut #   5.24   3.91           Neut %   79.0   68.0           nRBC   0.0   0.0           QRS Duration               [facility] QTC Calculation               Platelet Count   182   201           POC Anion Gap       18         POC BUN       22         POC Chloride       99         POC Creatinine       1.2         POC Glucose       128         POC Hematocrit       48         POC HEMOGLOBIN       16         POC Ionized Calcium       1.28         POC Potassium       4.2         POC Sodium       138         POC TCO2 (MEASURED)       25         POCT Glucose 116         129       Potassium   4.1   4.2           PROTEIN TOTAL   6.4   7.2           PT     12.7           RBC   5.04   5.23           RDW   11.9   11.8           Sample       VENOUS         Sodium   135   137           Total Cholesterol/HDL Ratio     3           Triglycerides     177           Troponin I     <0.010           TSH     3.138           Very Low Density Lipoprotein     35           WBC   6.63   5.75                                  Significant Imaging: I have reviewed all pertinent imaging results/findings within the past 24 hours.  Assessment/Plan:     * Numbness and tingling  Stroke workup has been negative so far MRI was negative.  Neurology recommended if he is still symptomatic repeat MRI tomorrow.  He is still having left-sided facial numbness no drooping no slurred speech.  He does have some point tenderness in the on the left mastoid at the insertion of  the trapezius that has reproducible.  Will try mild tizanidine.  He states normally Fioricet helps however did not help this time  Neurology has signed off.  Continue aspirin statin.  He did have a brief episode of paroxysmal atrial flutter after his bypass however he has been in normal sinus rhythm since.  He is not on any oral anticoagulation.  Physical therapy evaluation      Transient neurological symptoms  Still present see above.      Wound of sternal region  On suppressive Bactrim, resume this      Paroxysmal atrial fibrillation  Patient has paroxysmal (<7 days) atrial fibrillation. Patient is currently in sinus rhythm. [id number] Score: 2. The patients heart rate in the last 24 hours is as follows:  Pulse  Min: 57  Max: 67     Antiarrhythmics  metoprolol succinate (TOPROL-XL) 24 hr tablet 100 mg, Daily, Oral    Anticoagulants       Plan  - Replete lytes with a goal of K>4, Mg >2  - Patient is not anticoagulated due to be in normal sinus rhythm is followed by his cardiologist  - Patient's afib is currently  not in AFib, this only happened briefly after his CABG  -         BPH with obstruction/lower urinary tract symptoms  Is on alfuzosin however not on formulary will replace with Flomax        VTE Risk Mitigation (From admission, onward)           Ordered     IP VTE HIGH RISK PATIENT  Once         02/22/25 1854     Place sequential compression device  Until discontinued         02/22/25 1854                                    VANESSA PATTERSON MD  Department of Hospital Medicine  [facility] - Neurology

## 2025-02-24 LAB
POCT GLUCOSE: 110 MG/DL (ref 70–110)
POCT GLUCOSE: 115 MG/DL (ref 70–110)

## 2025-02-24 PROCEDURE — 25000003 PHARM REV CODE 250: Performed by: INTERNAL MEDICINE

## 2025-02-24 PROCEDURE — 99233 SBSQ HOSP IP/OBS HIGH 50: CPT | Mod: ,,, | Performed by: INTERNAL MEDICINE

## 2025-02-24 PROCEDURE — 21400001 HC TELEMETRY ROOM

## 2025-02-24 RX ADMIN — TIZANIDINE 4 MG: 4 TABLET ORAL at 06:02

## 2025-02-24 RX ADMIN — SULFAMETHOXAZOLE AND TRIMETHOPRIM 1 TABLET: 800; 160 TABLET ORAL at 10:02

## 2025-02-24 RX ADMIN — METOPROLOL SUCCINATE 100 MG: 50 TABLET, EXTENDED RELEASE ORAL at 10:02

## 2025-02-24 RX ADMIN — FAMOTIDINE 20 MG: 20 TABLET, FILM COATED ORAL at 10:02

## 2025-02-24 RX ADMIN — Medication 6 MG: at 09:02

## 2025-02-24 RX ADMIN — LISINOPRIL 20 MG: 20 TABLET ORAL at 10:02

## 2025-02-24 RX ADMIN — TAMSULOSIN HYDROCHLORIDE 0.4 MG: 0.4 CAPSULE ORAL at 10:02

## 2025-02-24 RX ADMIN — SULFAMETHOXAZOLE AND TRIMETHOPRIM 1 TABLET: 800; 160 TABLET ORAL at 09:02

## 2025-02-24 RX ADMIN — ATORVASTATIN CALCIUM 20 MG: 10 TABLET, FILM COATED ORAL at 10:02

## 2025-02-24 RX ADMIN — FAMOTIDINE 20 MG: 20 TABLET, FILM COATED ORAL at 09:02

## 2025-02-24 RX ADMIN — HYDROCHLOROTHIAZIDE 12.5 MG: 12.5 TABLET ORAL at 10:02

## 2025-02-24 NOTE — PROGRESS NOTES
[facility] - Neurology Hospital Medicine  Progress Note    Patient Name: Ahmet Edmonds  MRN: [id number]  Patient Class: IP- Inpatient   Admission Date: 2/22/2025  Length of Stay: 2 days  Attending Physician: Houston De La Fuente MD  Primary Care Provider: Houston De La Fuente MD        Subjective     Principal Problem:Numbness and tingling        HPI:  Laz is a 69-year-old gentleman patient of Dr. De La Fuente with a past medical history of coronary disease hypertension presented to the emergency room with left-sided facial numbness and off-balance swaying to the left.  His off-balance is not recent however the left side of his face is new that began this morning he is out of the tPA window CT of the head was negative stroke workup was negative MRI was negative.  Seen by Neurology.  This morning he is still has left-sided facial numbness and also point tenderness along the mastoid in the left radiating downward.  Normally Fioricet helps this however is not helping today.  Is willing to try muscle relaxer.  MRI was negative neurology recommended if symptoms persist maybe repeat another MRI.  Otherwise hemodynamically he is stable being admitted for stroke workup however stroke workup has been negative so far.    Overview/Hospital Course:  No notes on file    Interval History:  Overall stable does still have some paresthesia left face.  He did have left deviation upon ambulation.  He is reluctant to go home because he is still somewhat symptomatic but overall improved    Review of Systems   Constitutional: Negative.    HENT: Negative.     Eyes: Negative.    Respiratory:  Negative for cough, chest tightness and shortness of breath.    Cardiovascular:  Negative for chest pain and leg swelling.   Gastrointestinal:  Negative for abdominal pain, diarrhea, nausea and vomiting.   Endocrine: Negative.    Genitourinary: Negative.    Musculoskeletal: Negative.    Skin: Negative.    Allergic/Immunologic: Negative.     Neurological:  Positive for weakness and numbness. Negative for headaches.   Hematological: Negative.    Psychiatric/Behavioral: Negative.       Objective:     Vital Signs (Most Recent):  Temp: 98.2 °F (36.8 °C) (02/24/25 1540)  Pulse: 64 (02/24/25 1540)  Resp: 11 (02/22/25 1930)  BP: 106/65 (02/24/25 1540)  SpO2: 96 % (02/24/25 1540) Vital Signs (24h Range):  Temp:  [97.3 °F (36.3 °C)-98.9 °F (37.2 °C)] 98.2 °F (36.8 °C)  Pulse:  [58-64] 64  SpO2:  [94 %-98 %] 96 %  BP: ()/(52-73) 106/65     Weight: 101.6 kg (224 lb)  Body mass index is 30.38 kg/m².    Intake/Output Summary (Last 24 hours) at 2/24/2025 1646  Last data filed at 2/24/2025 0556  Gross per 24 hour   Intake --   Output 500 ml   Net -500 ml         Physical Exam  Eyes:      Pupils: Pupils are equal, round, and reactive to light.   Cardiovascular:      Rate and Rhythm: Normal rate.      Pulses: Normal pulses.      Heart sounds: No murmur heard.  Pulmonary:      Effort: Pulmonary effort is normal.      Breath sounds: Normal breath sounds.   Abdominal:      General: Abdomen is flat. Bowel sounds are normal. There is no distension.      Palpations: Abdomen is soft.      Tenderness: There is no guarding.   Musculoskeletal:         General: Normal range of motion.      Cervical back: Normal range of motion and neck supple.   Skin:     General: Skin is warm.   Neurological:      General: No focal deficit present.      Mental Status: He is alert.   Psychiatric:         Mood and Affect: Mood normal.         Behavior: Behavior normal.             Significant Labs: All pertinent labs within the past 24 hours have been reviewed.  Recent Lab Results         02/24/25  1607   02/24/25  1214   02/23/25  1719        POCT Glucose 110   115   110               Significant Imaging: I have reviewed all pertinent imaging results/findings within the past 24 hours.    Assessment and Plan     * Numbness and tingling  Stroke workup has been negative so far MRI was  negative.  He is still having left-sided facial numbness no drooping no slurred speech.  He does have some point tenderness in the on the left mastoid at the insertion of the trapezius that has reproducible.  Will try mild tizanidine.  He did say that tizanidine seemed to help he did get a good night sleep last night.    Neurology has signed off.  Continue aspirin statin.  He did have a brief episode of paroxysmal atrial flutter after his bypass however he has been in normal sinus rhythm since.  He is not on any oral anticoagulation.  Physical therapy evaluation  May benefit from Plavix for 3 weeks    Transient neurological symptoms  Still present see above.      Wound of sternal region  On suppressive Bactrim, resume this      Paroxysmal atrial fibrillation  Patient has paroxysmal (<7 days) atrial fibrillation. Patient is currently in sinus rhythm. [id number] Score: 2. The patients heart rate in the last 24 hours is as follows:  Pulse  Min: 57  Max: 67     Antiarrhythmics  metoprolol succinate (TOPROL-XL) 24 hr tablet 100 mg, Daily, Oral    Anticoagulants       Plan  - Replete lytes with a goal of K>4, Mg >2  - Patient is not anticoagulated due to be in normal sinus rhythm is followed by his cardiologist  - Patient's afib is currently  not in AFib, this only happened briefly after his CABG  -         BPH with obstruction/lower urinary tract symptoms  Is on alfuzosin however not on formulary will replace with Flomax        VTE Risk Mitigation (From admission, onward)           Ordered     IP VTE HIGH RISK PATIENT  Once         02/22/25 1854     Place sequential compression device  Until discontinued         02/22/25 1854                    Discharge Planning   BILLIE:      Code Status: Full Code   Medical Readiness for Discharge Date:                            VANESSA PATTERSON MD  Department of Hospital Medicine   [facility] - Neurology

## 2025-02-24 NOTE — SUBJECTIVE & OBJECTIVE
Interval History:  Overall stable does still have some paresthesia left face.  He did have left deviation upon ambulation.  He is reluctant to go home because he is still somewhat symptomatic but overall improved    Review of Systems   Constitutional: Negative.    HENT: Negative.     Eyes: Negative.    Respiratory:  Negative for cough, chest tightness and shortness of breath.    Cardiovascular:  Negative for chest pain and leg swelling.   Gastrointestinal:  Negative for abdominal pain, diarrhea, nausea and vomiting.   Endocrine: Negative.    Genitourinary: Negative.    Musculoskeletal: Negative.    Skin: Negative.    Allergic/Immunologic: Negative.    Neurological:  Positive for weakness and numbness. Negative for headaches.   Hematological: Negative.    Psychiatric/Behavioral: Negative.       Objective:     Vital Signs (Most Recent):  Temp: 98.2 °F (36.8 °C) (02/24/25 1540)  Pulse: 64 (02/24/25 1540)  Resp: 11 (02/22/25 1930)  BP: 106/65 (02/24/25 1540)  SpO2: 96 % (02/24/25 1540) Vital Signs (24h Range):  Temp:  [97.3 °F (36.3 °C)-98.9 °F (37.2 °C)] 98.2 °F (36.8 °C)  Pulse:  [58-64] 64  SpO2:  [94 %-98 %] 96 %  BP: ()/(52-73) 106/65     Weight: 101.6 kg (224 lb)  Body mass index is 30.38 kg/m².    Intake/Output Summary (Last 24 hours) at 2/24/2025 1646  Last data filed at 2/24/2025 0556  Gross per 24 hour   Intake --   Output 500 ml   Net -500 ml         Physical Exam  Eyes:      Pupils: Pupils are equal, round, and reactive to light.   Cardiovascular:      Rate and Rhythm: Normal rate.      Pulses: Normal pulses.      Heart sounds: No murmur heard.  Pulmonary:      Effort: Pulmonary effort is normal.      Breath sounds: Normal breath sounds.   Abdominal:      General: Abdomen is flat. Bowel sounds are normal. There is no distension.      Palpations: Abdomen is soft.      Tenderness: There is no guarding.   Musculoskeletal:         General: Normal range of motion.      Cervical back: Normal range of motion  and neck supple.   Skin:     General: Skin is warm.   Neurological:      General: No focal deficit present.      Mental Status: He is alert.   Psychiatric:         Mood and Affect: Mood normal.         Behavior: Behavior normal.             Significant Labs: All pertinent labs within the past 24 hours have been reviewed.  Recent Lab Results         02/24/25  1607   02/24/25  1214   02/23/25  1719        POCT Glucose 110   115   110               Significant Imaging: I have reviewed all pertinent imaging results/findings within the past 24 hours.

## 2025-02-25 VITALS
WEIGHT: 224 LBS | TEMPERATURE: 98 F | RESPIRATION RATE: 11 BRPM | HEIGHT: 72 IN | OXYGEN SATURATION: 93 % | DIASTOLIC BLOOD PRESSURE: 72 MMHG | SYSTOLIC BLOOD PRESSURE: 113 MMHG | BODY MASS INDEX: 30.34 KG/M2 | HEART RATE: 79 BPM

## 2025-02-25 LAB — POCT GLUCOSE: 100 MG/DL (ref 70–110)

## 2025-02-25 PROCEDURE — 25000003 PHARM REV CODE 250: Performed by: INTERNAL MEDICINE

## 2025-02-25 PROCEDURE — 99238 HOSP IP/OBS DSCHRG MGMT 30/<: CPT | Mod: ,,, | Performed by: INTERNAL MEDICINE

## 2025-02-25 RX ORDER — CLOPIDOGREL BISULFATE 75 MG/1
75 TABLET ORAL DAILY
Qty: 30 TABLET | Refills: 11 | Status: SHIPPED | OUTPATIENT
Start: 2025-02-25 | End: 2026-02-25

## 2025-02-25 RX ORDER — NAPROXEN SODIUM 220 MG/1
81 TABLET, FILM COATED ORAL ONCE
Qty: 30 TABLET | Refills: 6 | Status: SHIPPED | OUTPATIENT
Start: 2025-02-25 | End: 2025-02-25

## 2025-02-25 RX ORDER — CLOPIDOGREL BISULFATE 75 MG/1
75 TABLET ORAL DAILY
Status: DISCONTINUED | OUTPATIENT
Start: 2025-02-25 | End: 2025-02-25 | Stop reason: HOSPADM

## 2025-02-25 RX ORDER — TAMSULOSIN HYDROCHLORIDE 0.4 MG/1
0.4 CAPSULE ORAL DAILY
Qty: 30 CAPSULE | Refills: 11 | Status: SHIPPED | OUTPATIENT
Start: 2025-02-26 | End: 2026-02-26

## 2025-02-25 RX ORDER — ROSUVASTATIN CALCIUM 5 MG/1
20 TABLET, COATED ORAL NIGHTLY
Qty: 90 TABLET | Refills: 3 | Status: SHIPPED | OUTPATIENT
Start: 2025-02-25

## 2025-02-25 RX ADMIN — HYDROCHLOROTHIAZIDE 12.5 MG: 12.5 TABLET ORAL at 08:02

## 2025-02-25 RX ADMIN — CLOPIDOGREL BISULFATE 75 MG: 75 TABLET ORAL at 11:02

## 2025-02-25 RX ADMIN — METOPROLOL SUCCINATE 100 MG: 50 TABLET, EXTENDED RELEASE ORAL at 08:02

## 2025-02-25 RX ADMIN — TAMSULOSIN HYDROCHLORIDE 0.4 MG: 0.4 CAPSULE ORAL at 08:02

## 2025-02-25 RX ADMIN — LISINOPRIL 20 MG: 20 TABLET ORAL at 08:02

## 2025-02-25 RX ADMIN — FAMOTIDINE 20 MG: 20 TABLET, FILM COATED ORAL at 08:02

## 2025-02-25 RX ADMIN — SULFAMETHOXAZOLE AND TRIMETHOPRIM 1 TABLET: 800; 160 TABLET ORAL at 08:02

## 2025-02-25 RX ADMIN — ATORVASTATIN CALCIUM 20 MG: 10 TABLET, FILM COATED ORAL at 08:02

## 2025-02-25 NOTE — PLAN OF CARE
02/25/25 1259   Final Note   Assessment Type Final Discharge Note   Anticipated Discharge Disposition Home   Post-Acute Status   Coverage patient refuses therapy, rehab , home health  and [id number].   Discharge Delays None known at this time

## 2025-02-25 NOTE — DISCHARGE SUMMARY
Discharge summary  Hospital Medicine    Patient Name: Ahmet Edmonds  YOB: 1955    Admit Date: 2/22/2025                     LOS: 3    SUBJECTIVE:     Reason for Admission:  Transient neurological symptoms  See H&P for detailed presentating history and ROS.      Interval history:  69-year-old male history of elevated PSA, arteriosclerotic coronary heart disease status post CABG thereafter a dehiscence of the wound with chronic infection now on Bactrim.  Patient refers woke up on Saturday with a symptoms of numbness in the left side of his face and when he tried to got up he was dizzy.  He went ahead and was able to dress himself was symptoms persisted recently decided to drive to the emergency room where patient has been admitted to rule out stroke   After extensive neurological workup patient has some disease of the carotids but no significant for any interventional procedure, with that said he has not been compliant with the aspirin will go ahead and add Plavix once a day for a month   Patient refers he feels 200% better today and ready to go      OBJECTIVE:     Vital Signs Range (Last 24H):  Temp:  [97.6 °F (36.4 °C)-98.7 °F (37.1 °C)]   Pulse:  [61-79]   BP: (105-122)/(65-73)   SpO2:  [93 %-98 %] Body mass index is 30.38 kg/m².  Wt Readings from Last 3 Encounters:   02/22/25 1742 101.6 kg (224 lb)   02/22/25 1712 102 kg (224 lb 13.9 oz)   01/31/25 0839 99.8 kg (220 lb)   12/05/24 1445 102.1 kg (225 lb)       I & O (Last 24H):  Intake/Output Summary (Last 24 hours) at 2/25/2025 0955  Last data filed at 2/25/2025 0516  Gross per 24 hour   Intake --   Output 600 ml   Net -600 ml       Physical Exam:  Patient alert oriented x3 clear speech no obvious noticeable acute neurological deficits   HEENT facial muscles are symmetric extraocular movements are intact oral mucosa is moist tongue is midline neck is supple   Heart regular rhythm  Lungs are clear bilaterally   Abdomen obese soft and depressible  "nontender patient had a bowel movement this morning   Extremities no edema   Cranial nerves 2-12 grossly intact with no acute gross or motor or sensory deficits    Diagnostic Results:  Lab Results   Component Value Date    WBC 6.63 02/23/2025    HGB 15.4 02/23/2025    HCT 45.7 02/23/2025    MCV 90.7 02/23/2025     02/23/2025     No results for input(s): "GLU", "NA", "K", "CL", "CO2", "BUN", "CREATININE", "CALCIUM", "MG" in the last 24 hours.  Lab Results   Component Value Date    INR 1.0 02/22/2025    INR 1.1 11/14/2023    INR 1.3 11/08/2023     Lab Results   Component Value Date    HGBA1C 5.4 01/31/2025     Recent Labs     02/22/25  1707 02/23/25  1106 02/23/25  1719 02/24/25  1214 02/24/25  1607   POCTGLUCOSE 129* 116* 110 115* 110       ASSESSMENT/PLAN:     Active Hospital Problems    Diagnosis  POA    *Transient neurological symptoms [R29.818]  Yes     Still present see above.      Numbness and tingling [R20.0, R20.2]  Yes     Stroke workup has been negative so far MRI was negative.  Neurology recommended if he is still symptomatic repeat MRI tomorrow.  He is still having left-sided facial numbness no drooping no slurred speech.  He does have some point tenderness in the on the left mastoid at the insertion of the trapezius that has reproducible.  Will try mild tizanidine.  He states normally Fioricet helps however did not help this time  Neurology has signed off.  Continue aspirin statin.  He did have a brief episode of paroxysmal atrial flutter after his bypass however he has been in normal sinus rhythm since.  He is not on any oral anticoagulation.  Physical therapy evaluation  Offered him discharge with p.o. Plavix and aspirin statin he was reluctant to go today since he is still is slightly symptomatic with numbness in his left face.  He is ambulatory but does have tendency to veer to his left.      Wound of sternal region [S21.109A]  Yes    BPH with obstruction/lower urinary tract symptoms [N40.1, " N13.8]  Yes    Paroxysmal atrial fibrillation [I48.0]  Yes      Resolved Hospital Problems   No resolved problems to display.        Problems Addressed Today:    Numbness and tingling  - presented with left sided numbness/tingling, Left posterior HA, left leaning gait  - Stroke RF: CAD, HLD, HTN  - Intervention: Hypo attenuation on CT head, no TNK  - Etiology: TBD    Stroke workup:  -CTh: Left frontal lobe periventricular hypoattenuation may represent subacute nonhemorrhagic infarct   -CTA h/n: Left proximal internal carotid artery calcified plaque causing up to 42% stenosis   -MRI brain: 1.  No acute intracranial findings identified.   2.  Left frontal lobe periventricular encephalomalacia combined with gliotic changes.   -ECHO:    -LDL: 72.00   -TSH: 3.138   -home medications include: aspirin 81 mg, rosuvastatin 5 mg  NIH Stroke Scale      1a  Level of consciousness: 0=alert; keenly responsive   1b. LOC questions:  0=Answers both tasks correctly   1c. LOC commands: 0=Answers both tasks correctly   2.  Best Gaze: 0=normal   3.  Visual: 0=No visual loss   4. Facial Palsy: 0=Normal symmetric movement   5a.  Motor left arm: 0=No drift, limb holds 90 (or 45) degrees for full 10 seconds   5b.  Motor right arm: 0=No drift, limb holds 90 (or 45) degrees for full 10 seconds   6a. motor left le=No drift, limb holds 90 (or 45) degrees for full 10 seconds   6b  Motor right le=No drift, limb holds 90 (or 45) degrees for full 10 seconds   7. Limb Ataxia: 0=Absent   8.  Sensory: 0=Normal; no sensory loss   9. Best Language:  0=No aphasia, normal   10. Dysarthria: 0=Normal   11. Extinction and Inattention: 0=No abnormality   12. Distal motor function: 0=Normal    Total:   0         Plan:  -continue stroke workup  -continue Aspirin 81mg daily  -continue Atorvastatin 40mg daily  -PT/OT/ST to evaluate  -SCD for DVT prophylaxis   - Allow permissive HTN ... prn hydralazine and labetalol for SBP > 220 or DBP > 120     - after  24 hours from symptom onset, ok to normalize blood pressure  - Neuro checks q4hr ... stat CTh if any neuro change   - Continuous telemetry monitoring  - NPO until passes Pedro or cleared by SLP  - No bedrest, ok for patient to ambulate, RN to observe first ambulation for safety       Stroke workup has been negative so far MRI was negative.  Neurology recommended if he is still symptomatic repeat MRI tomorrow.  He is still having left-sided facial numbness no drooping no slurred speech.  He does have some point tenderness in the on the left mastoid at the insertion of the trapezius that has reproducible.  Will try mild tizanidine.  He states normally Fioricet helps however did not help this time  Neurology has signed off.  Continue aspirin statin.  He did have a brief episode of paroxysmal atrial flutter after his bypass however he has been in normal sinus rhythm since.  He is not on any oral anticoagulation.  Physical therapy evaluation      Transient neurological symptoms  Still present see above.      BPH with obstruction/lower urinary tract symptoms  Is on alfuzosin however not on formulary will replace with Flomax      Paroxysmal atrial fibrillation  Patient has paroxysmal (<7 days) atrial fibrillation. Patient is currently in sinus rhythm. [id number] Score: 2. The patients heart rate in the last 24 hours is as follows:  Pulse  Min: 57  Max: 67     Antiarrhythmics  metoprolol succinate (TOPROL-XL) 24 hr tablet 100 mg, Daily, Oral    Anticoagulants       Plan  - Replete lytes with a goal of K>4, Mg >2  - Patient is not anticoagulated due to be in normal sinus rhythm is followed by his cardiologist  - Patient's afib is currently  not in AFib, this only happened briefly after his CABG  -         Wound of sternal region  On suppressive Bactrim, resume this      Numbness and tingling  Stroke workup has been negative so far MRI was negative.  He is still having left-sided facial numbness no drooping no slurred  speech.  He does have some point tenderness in the on the left mastoid at the insertion of the trapezius that has reproducible.  Will try mild tizanidine.  He did say that tizanidine seemed to help he did get a good night sleep last night.    Neurology has signed off.  Continue aspirin statin.  He did have a brief episode of paroxysmal atrial flutter after his bypass however he has been in normal sinus rhythm since.  He is not on any oral anticoagulation.  Physical therapy evaluation  May benefit from Plavix for 3 weeks      DISCHARGE PLANNING:   All findings discussed with the patient, the plan discussed with the patient patient has agree   Increase Crestor to 20 mg, patient advised to add fish oil at bedtime as well baby aspirin and Plavix   Return to clinic in 1 week    Signing Physician:  Houston Buchanan MD

## 2025-02-25 NOTE — PLAN OF CARE
Patient refuses therapy, home health and rolling walker. Patient isitant to drive self home . Dr Houston De La Fuente is aware of this information.   02/25/25 1057   Discharge Assessment   Assessment Type Discharge Planning Assessment   Confirmed/corrected address, phone number and insurance Yes   Confirmed Demographics Correct on Facesheet  (Physical Address is [address] , [place] [address])   Source of Information patient   When was your last doctors appointment?   (Houston De La Fuente MD)   Communicated BILLIE with patient/caregiver Date not available/Unable to determine   Reason For Admission Transient Neurological  Symptoms   People in Home alone   Do you expect to return to your current living situation? Yes   Do you have help at home or someone to help you manage your care at home? No   Prior to hospitilization cognitive status: Unable to Assess   Current cognitive status: Alert/Oriented   Walking or Climbing Stairs Difficulty no   Dressing/Bathing Difficulty no   Home Accessibility stairs to enter home   Number of Stairs, Main Entrance three   Surface of Stairs, Main Entrance concrete   Stair Railings, Main Entrance railings safe and in good condition;railings on both sides of stairs   Landing, Stairs, Main Entrance adequate turning radius   Stairs Comment, Main Entrance 3 steps to enter with rails   Home Layout Able to live on 1st floor   Equipment Currently Used at Home none   Readmission within 30 days? No   Patient currently being followed by outpatient case management? No   Do you currently have service(s) that help you manage your care at home? No   Do you take prescription medications? Yes   Do you have prescription coverage? Yes   Coverage [employer] Managed Medicare   Do you have any problems affording any of your prescribed medications? No   Is the patient taking medications as prescribed? yes   Who is going to help you get home at discharge? Driving self home .   How do you get to doctors appointments? car,  drives self   Are you on dialysis? No   Do you take coumadin? No   Discharge Plan A Home;Home with family;Home Health   Discharge Plan B Home   DME Needed Upon Discharge  none   Discharge Plan discussed with: Patient   Transition of Care Barriers Other (see comments)  (Patient refuses home health , rolling walker. Patient insist he is driving self home.)   Physical Activity   On average, how many days per week do you engage in moderate to strenuous exercise (like a brisk walk)? 0 days   On average, how many minutes do you engage in exercise at this level? 0 min   Financial Resource Strain   How hard is it for you to pay for the very basics like food, housing, medical care, and heating? Not hard   Housing Stability   In the last 12 months, was there a time when you were not able to pay the mortgage or rent on time? N   At any time in the past 12 months, were you homeless or living in a shelter (including now)? N   Transportation Needs   Has the lack of transportation kept you from medical appointments, meetings, work or from getting things needed for daily living? No   Food Insecurity   Within the past 12 months, you worried that your food would run out before you got the money to buy more. Never true   Within the past 12 months, the food you bought just didn't last and you didn't have money to get more. Never true   Stress   Do you feel stress - tense, restless, nervous, or anxious, or unable to sleep at night because your mind is troubled all the time - these days? Only a littl   Social Isolation   How often do you feel lonely or isolated from those around you?  Never   Alcohol Use   Q1: How often do you have a drink containing alcohol? Monthly or l  (drinks a beer occassionally. 2 or 3 times a yerar)   Q2: How many drinks containing alcohol do you have on a typical day when you are drinking? 1 or 2   Q3: How often do you have six or more drinks on one occasion? Never   Utilities   In the past 12 months has the  electric, gas, oil, or water company threatened to shut off services in your home? No   Health Literacy   How often do you need to have someone help you when you read instructions, pamphlets, or other written material from your doctor or pharmacy? Never

## 2025-02-25 NOTE — NURSING
Patient has been discharged from the facility with patient belongings. Patient has left via wheelchair with transport.

## 2025-02-26 ENCOUNTER — PATIENT OUTREACH (OUTPATIENT)
Dept: ADMINISTRATIVE | Facility: CLINIC | Age: 70
End: 2025-02-26
Payer: MEDICARE

## 2025-02-26 NOTE — PROGRESS NOTES
C3 nurse attempted to contact Ahmet Edmonds for a TCC post hospital discharge follow up call. No answer. Left voicemail with callback information. The patient has a scheduled [facility]OS[facility] appointment with Houston De La Fuente MD (Internal Medicine); scheduled on 03/14 at 10:40AM.

## 2025-02-27 NOTE — PROGRESS NOTES
C3 nurse spoke with Ahmet Edmonds for a TCC post hospital discharge follow up call. The patient has a scheduled [facility] appointment with Houston De La Fuente MD (Internal Medicine); scheduled on 3/5/25 @11am.

## 2025-02-27 NOTE — PROGRESS NOTES
2nd attempt made to reach patient for TCC call. Left voicemail please call [phone] leave first name, last, and date of birth for Karl. I will return your call.

## 2025-03-05 ENCOUNTER — OFFICE VISIT (OUTPATIENT)
Dept: INTERNAL MEDICINE | Facility: CLINIC | Age: 70
End: 2025-03-05
Payer: MEDICARE

## 2025-03-05 VITALS
SYSTOLIC BLOOD PRESSURE: 124 MMHG | OXYGEN SATURATION: 98 % | HEIGHT: 72 IN | DIASTOLIC BLOOD PRESSURE: 72 MMHG | BODY MASS INDEX: 29.93 KG/M2 | WEIGHT: 221 LBS | RESPIRATION RATE: 18 BRPM | HEART RATE: 71 BPM

## 2025-03-05 DIAGNOSIS — R29.818 TRANSIENT NEUROLOGICAL SYMPTOMS: ICD-10-CM

## 2025-03-05 DIAGNOSIS — R97.20 ELEVATED PSA: ICD-10-CM

## 2025-03-05 DIAGNOSIS — I10 ESSENTIAL HYPERTENSION: ICD-10-CM

## 2025-03-05 DIAGNOSIS — Z95.1 S/P CABG (CORONARY ARTERY BYPASS GRAFT): ICD-10-CM

## 2025-03-05 DIAGNOSIS — Z09 HOSPITAL DISCHARGE FOLLOW-UP: Primary | ICD-10-CM

## 2025-03-05 NOTE — PROGRESS NOTES
"Patient ID: Ahmet Edmonds is a 69 y.o. male.  Chief Complaint: Hospital Follow Up (His L side temple is tender/C/O nasal congestion on L side of nose )    HPI:   History of Present Illness           70yo male  here on follow up from TIA , stable  all neuro workup negative  , now on Plavix  and  ASA  .  Stable  no recurrence  or deficits .  Here with many questions  , all chart reviewd  all Ok   PSA  4.47   Has appoint  Dr Chahal  next week in [place] .   All labs  discuused all Ok .     Current Medications[1]  ROS:   Constitutional: No weight gain, No fever, No chills, No fatigue.   Eyes: No blurring, No visual disturbances.   Ear/Nose/Mouth/Throat: No decreased hearing, No ear pain, No nasal congestion, No sore throat.   Respiratory: No shortness of breath, No cough, No wheezing.   Cardiovascular: No chest pain, No palpitations, No peripheral edema.   Gastrointestinal: No nausea, No vomiting, No diarrhea, No constipation, No abdominal pain.   Genitourinary: No dysuria, No hematuria.   Hematology/Lymphatics: No bruising tendency, No bleeding tendency, No swollen lymph glands.   Endocrine: No excessive thirst, No polyuria, No excessive hunger.   Musculoskeletal: No joint pain, No muscle pain, No decreased range of motion.   Integumentary: No rash, No pruritus.   Neurologic: No abnormal balance, No confusion, No headache.   Psychiatric: No anxiety, No depression, Not suicidal, No hallucinations.    PE/Vitals:   /72 (BP Location: Left arm, Patient Position: Sitting)   Pulse 71   Resp 18   Ht 6' 0.01" (1.829 m)   Wt 100.2 kg (221 lb)   SpO2 98%   BMI 29.97 kg/m²   General: Alert and oriented, No acute distress.   Eye: Normal conjunctiva without exudate.  HENMT: Normocephalic/AT, Normal hearing, Oral mucosa is moist and pink   Neck: No goiter visualized.   Respiratory: Lungs CTAB, Respirations are non-labored, Breath sounds are equal, Symmetrical chest wall expansion.  Cardiovascular: Normal rate, Regular " rhythm, No murmur, No edema.   Gastrointestinal: Non-distended.   Genitourinary: Deferred.  Musculoskeletal: Normal ROM, Normal gait, No deformities or amputations.  Integumentary: Warm, Dry, Intact. No diaphoresis, or flushing.  Neurologic: No focal deficits, Cranial Nerves II-XII are grossly intact.   Psychiatric: Cooperative, Appropriate mood & affect, Normal judgment, Non-suicidal.  Assessment/Plan   Assessment & Plan             1. Hospital discharge follow-up  Comments:  all chart reviewed   patient questions  all answered   had a TIA    2. Transient neurological symptoms  Comments:  all stable  on plavix  and ASA  Overview:  Still present see above.      3. S/P CABG (coronary artery bypass graft)  Comments:  stable  on meds  no angina    4. Essential hypertension  Comments:  stable  on benazepril  and  zebeta    5. Elevated PSA  Comments:  has appoint  with DR Samuel Chahal   in [place]   for green therapy      No orders of the defined types were placed in this encounter.    Education and counseling done face to face regarding medical conditions and plan. Contact office if new symptoms develop. Should any symptoms ever significantly worsen seek emergency medical attention/go to ER. Follow up at least yearly for wellness or sooner PRN. Nurse to call patient with any results. The patient is receptive, expresses understanding and is agreeable to plan. All questions have been answered.  Follow up in about 8 weeks (around 4/30/2025).  This note was generated with the assistance of ambient listening technology. Verbal consent was obtained by the patient and accompanying visitor(s) for the recording of patient appointment to facilitate this note. I attest to having reviewed and edited the generated note for accuracy, though some syntax or spelling errors may persist. Please contact the author of this note for any clarification.            [1]   Current Outpatient Medications:     alfuzosin (UROXATRAL) 10 mg Tb24,  Take 10 mg by mouth daily with breakfast., Disp: , Rfl:     aspirin 81 MG Chew, 1 tablet once., Disp: , Rfl:     benazepril-hydrochlorthiazide (LOTENSIN HCT) 20-12.5 mg per tablet, Take 0.5 tablets by mouth., Disp: , Rfl:     bisoprolol (ZEBETA) 5 MG tablet, Take 2.5 mg by mouth once daily., Disp: , Rfl:     clopidogreL (PLAVIX) 75 mg tablet, Take 1 tablet (75 mg total) by mouth once daily., Disp: 30 tablet, Rfl: 11    rosuvastatin (CRESTOR) 5 MG tablet, Take 4 tablets (20 mg total) by mouth every evening., Disp: 90 tablet, Rfl: 3    sulfamethoxazole-trimethoprim 800-160mg (BACTRIM DS) 800-160 mg Tab, Take 1 tablet by mouth 2 (two) times daily., Disp: 60 tablet, Rfl: 1

## 2025-04-01 ENCOUNTER — OFFICE VISIT (OUTPATIENT)
Dept: INFECTIOUS DISEASES | Facility: CLINIC | Age: 70
End: 2025-04-01
Payer: MEDICARE

## 2025-04-01 VITALS
SYSTOLIC BLOOD PRESSURE: 119 MMHG | RESPIRATION RATE: 16 BRPM | TEMPERATURE: 98 F | WEIGHT: 218 LBS | HEIGHT: 72 IN | HEART RATE: 71 BPM | DIASTOLIC BLOOD PRESSURE: 70 MMHG | BODY MASS INDEX: 29.53 KG/M2

## 2025-04-01 DIAGNOSIS — M86.9 OSTEOMYELITIS OF OTHER SITE, UNSPECIFIED TYPE: Primary | ICD-10-CM

## 2025-04-01 PROCEDURE — 99213 OFFICE O/P EST LOW 20 MIN: CPT | Mod: PBBFAC

## 2025-04-01 RX ORDER — SULFAMETHOXAZOLE AND TRIMETHOPRIM 800; 160 MG/1; MG/1
1 TABLET ORAL DAILY
Qty: 90 TABLET | Refills: 1 | Status: SHIPPED | OUTPATIENT
Start: 2025-04-01

## 2025-04-01 RX ORDER — ROSUVASTATIN CALCIUM 20 MG/1
20 TABLET, COATED ORAL NIGHTLY
COMMUNITY
Start: 2025-03-27

## 2025-04-01 NOTE — PROGRESS NOTES
[facility] INFECTIOUS DISEASES Clinic Note    CHIEF COMPLAINT: Referral for sternal osteomyelitis    HISTORY OF PRESENT ILLNESS:     69-year-old male with PMH CAD s/p CABG 10/24/23 complicated by sternal wound dehiscence with Klebsiella aerogenes infection, prostate cancer, HTN who presents to Infectious Disease Clinic today for his initial visit.  He was previously followed by Dr. Flynn for management of his recurrent sternal infection and is currently on Bactrim.    After undergoing his CABG surgery in 10/2023 he developed partial sternal incision the dehiscence which prompted a readmission.  On 11/04/2023 he underwent removal of sternal hardware with sternal rewiring and fixation with plating system.  After this operation his wound dehisced with purulence; cultures obtained showed growth of Klebsiella aerogenes.  He returned to the OR where he underwent removal of wires.  He was treated with ceftriaxone for 4 weeks.  He established are with Dr. Coronado prior to the end date of ceftriaxone and was started on ciprofloxacin due to presence of hardware.  His wound eventually healed until 03/2024 where a blister developed that ruptured; cultures again obtained showed growth of Klebsiella aerogenes.  His primary care physician placed him on topical gentamicin as organism now showed ESBL resistance.  Eventually he was started on meropenem and completed a 6 week course which ended on 05/02/2024.  He was monitored off antibiotics for about 4 months however eventually he had return of a blister over his distal incision area and drainage cultured showed  klebseilla aerogenes.  CT chest completed 09/13/2024 showed suspected osteomyelitis of the left 8th rib along with persistent retrosternal fluid collection.  He was started on Bactrim in 09/2024 (based off organism sensitivities) and has remained on this drug since this time.     Today, Patient is doing well.  Continued on Bactrim DS b.i.d. with start date 09/2024.   Has since received PET scan 02/11/2025 per Urologist Dr. Chahal. Reviewed, no obvious signs of sternal/anterior chest wall uptake. Patient is tolerating Bactrim w/o complaints.   Recent hospitalization  02/23/2025 to 02/25/2025 for TIA (abrupt onset left temporal headache with associated gait imbalance, left-sided facial numbness weakness; endorses increased left eye lacrimation, denies left-sided blurry vision).  CT in subsequent MRI head revealing left periventricular encephalomalacia; no definitive area of ischemia.  TNK not administered.  Placed on Plavix in addition to aspirin, continued on DAPT.       Visit 1/31/25:  Today he states he is doing well and denies any active areas of drainage on his chest wall.  He reports that he was seen by his PCP in 12/2024 where he was noted to have a small wound on the left lateral incision edge of his chest.  His PCP performed cauterization of this wound which did result in full wound closure.  He voices no complaints today.  He is tolerating his Bactrim without issue.    REVIEW OF SYSTEMS:  Review of Systems   Constitutional:  Negative for chills, fever, malaise/fatigue and weight loss.   HENT:  Negative for congestion and sore throat.    Eyes:  Negative for blurred vision.   Respiratory:  Negative for cough, sputum production and shortness of breath.    Cardiovascular:  Negative for chest pain, palpitations and leg swelling.   Gastrointestinal:  Negative for abdominal pain, diarrhea, nausea and vomiting.   Genitourinary:  Negative for dysuria.   Musculoskeletal:  Negative for joint pain.   Skin:  Negative for rash.   Neurological:  Negative for focal weakness, weakness and headaches.       PREVIOUS MEDICAL HISTORY:  Past Medical History:   Diagnosis Date    A-fib     Hypertension        PREVIOUS SURGICAL HISTORY:  Past Surgical History:   Procedure Laterality Date    CARDIAC SURGERY      INCISION AND DRAINAGE OF CHEST Left     KNEE ARTHROSCOPY Left           ALLERGIES:  Review of patient's allergies indicates:  No Known Allergies      MEDICATIONS:  Current Outpatient Medications on File Prior to Visit   Medication Sig Dispense Refill    aspirin 81 MG Chew 1 tablet once.      benazepril-hydrochlorthiazide (LOTENSIN HCT) 20-12.5 mg per tablet Take 0.5 tablets by mouth.      bisoprolol (ZEBETA) 5 MG tablet Take 2.5 mg by mouth once daily.      clopidogreL (PLAVIX) 75 mg tablet Take 1 tablet (75 mg total) by mouth once daily. 30 tablet 11    rosuvastatin (CRESTOR) 20 MG tablet Take 20 mg by mouth every evening.      [DISCONTINUED] sulfamethoxazole-trimethoprim 800-160mg (BACTRIM DS) 800-160 mg Tab Take 1 tablet by mouth 2 (two) times daily. 60 tablet 1    alfuzosin (UROXATRAL) 10 mg Tb24 Take 10 mg by mouth daily with breakfast. (Patient not taking: Reported on 4/1/2025)      [DISCONTINUED] rosuvastatin (CRESTOR) 5 MG tablet Take 4 tablets (20 mg total) by mouth every evening. 90 tablet 3     No current facility-administered medications on file prior to visit.          SOCIAL HISTORY:    reports that he has never smoked. He has never used smokeless tobacco. He reports current alcohol use. He reports that he does not use drugs.      FAMILY HISTORY:   Family History   Problem Relation Name Age of Onset    Heart disease Father Jacob     Hypertension Father Jacob        PHYSICAL EXAMINATION:  /70 (BP Location: Right arm, Patient Position: Sitting)   Pulse 71   Temp 98.1 °F (36.7 °C) (Oral)   Resp 16   Ht 6' (1.829 m)   Wt 98.9 kg (218 lb)   BMI 29.57 kg/m²  Body mass index is 29.57 kg/m².    Gen: awake, alert, NAD  HEENT: NC/AT, EOMi, anicteric sclera, no rhinorrhea, OP clear; tenderness to palpation noted to the left temporal area  Neck: no cervical LAD  CV: regular rate normal rhythm no murmur  Resp: easy WOB on RA CTABL no w/r/r  Abd: +BS, soft, NTTP, no HSM  Ext: warm, no LE edema  Skin: Well healed incision scar over his mid chest. No open wounds  visible.   Neuro: no focal deficits       LABORATORY DATA:  Lab Results   Component Value Date    WBC 6.63 02/23/2025    WBC 5.75 02/22/2025    WBC 5.44 01/31/2025    HGB 15.4 02/23/2025    HGB 16.2 02/22/2025    HGB 15.9 01/31/2025     02/23/2025     02/22/2025     01/31/2025    CREATININE 0.89 02/23/2025    CREATININE 1.15 02/22/2025    CREATININE 1.07 01/31/2025    ALT 25 02/23/2025    ALT 35 02/22/2025    ALT 29 01/31/2025    AST 19 02/23/2025    AST 22 02/22/2025    AST 21 01/31/2025    ALKPHOS 76 02/23/2025    ALKPHOS 77 02/22/2025    ALKPHOS 75 01/31/2025    BILITOT 0.6 02/23/2025    BILITOT 0.4 02/22/2025    BILITOT 0.7 01/31/2025    INR 1.0 02/22/2025    INR 1.1 11/14/2023    INR 1.3 11/08/2023       MICROBIOLOGY DATA:    3/14/24 Chest fluid exudate cx: ESBL Klebsiella aerogenes      8/30/24 L chest fluid exudate cx: ESBL Klebsiella aerogenes    IMAGING DATA:    9/13/24 CT chest w/ IV  Impression:     Persistent retrosternal fluid collection without loculated abscess formation.     Suspected osteomyelitis of the proximal left 8th rib.  With associated soft tissue edema extending anteriorly and posteriorly    ASSESSMENT:    69-year-old male with PMH CAD s/p CABG 10/24/23 complicated by sternal wound dehiscence with Klebsiella aerogenes infection, prostate cancer, HTN who presents to Infectious Disease Clinic today for his initial visit.  He was previously followed by Dr. Flynn for management of his recurrent sternal infection and is currently on Bactrim.    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 It seems this may have been used for chronic suppression purposes after his treatment for L 8th rib OM. Last CT chest completed showed aforementioned OM as well as fluid collection. He will need to have repeated imaging of this area, however as he has an upcoming PET CT (scheduled prior to his visit with urology for prostate cancer management) it is reasonable to follow up the results of this test.     It seems there also may still be some HW remaining from his initial CABG, and if so, bactrim suppression should be continued for at least 1 year. Will need to follow up PET CT to see if HW is seen.     Checking labs as well to monitor renal indices while on bactrim.      PLAN:    -reduction to Bactrim DS once daily for 3 months with decision of continue suppressive therapy at three-month intervals versus cessation of Bactrim at follow up visit  - reviewed PET CT, no evidence of ongoing infection to the anterior chest wall/sternum  - ordered CMP, ESR and CRP today      RTC 3 months    Mansoor Jacques MD  Internal Medicine - PGY-3  Infectious Disease Clinic - [facility] [place]

## 2025-04-02 ENCOUNTER — TELEPHONE (OUTPATIENT)
Dept: INTERNAL MEDICINE | Facility: CLINIC | Age: 70
End: 2025-04-02
Payer: MEDICARE

## 2025-04-02 NOTE — TELEPHONE ENCOUNTER
----- Message from Nurse Vela sent at 4/2/2025  8:43 AM CDT -----  Regarding: Dr. De La Fuente 04/09/2025 4 mth 1:40pm  Are there any outstanding tasks in chart? NoIs there any documentation of tasks? NoHas the pt seen another physician, been to ER, UCC, or admitted to hospital since last visit?Has the pt done blood work or imaging since last visit? 5. PLEASE HAVE PATIENT BRING MEDICATION LIST OR BOTTLES TO EVERY OFFICE VISIT

## 2025-04-16 ENCOUNTER — TELEPHONE (OUTPATIENT)
Dept: INTERNAL MEDICINE | Facility: CLINIC | Age: 70
End: 2025-04-16
Payer: MEDICARE

## 2025-04-16 NOTE — TELEPHONE ENCOUNTER
Tried to contact patient to get more info as to the reason for the call--No Answer/Left VM, Waiting on return call back

## 2025-04-16 NOTE — TELEPHONE ENCOUNTER
----- Message from Tomi sent at 4/16/2025 10:06 AM CDT -----  .Who Called: Ahmet EdmondsWhteresa order is the patient requesting: labs When does the expect the orders to be performed? Preferred Method of Contact: Phone CallPatient's Preferred Phone Number on File: [phone] Best Call Back Number, if different:Additional Information: pt called requesting to speak with nurse

## 2025-04-16 NOTE — TELEPHONE ENCOUNTER
Incomplete Message....    NO info given as to the reason for the call, will have to contact patient back to get all necessary info

## 2025-04-17 ENCOUNTER — TELEPHONE (OUTPATIENT)
Dept: INTERNAL MEDICINE | Facility: CLINIC | Age: 70
End: 2025-04-17
Payer: MEDICARE

## 2025-04-17 DIAGNOSIS — R53.83 FATIGUE, UNSPECIFIED TYPE: Primary | ICD-10-CM

## 2025-04-17 NOTE — TELEPHONE ENCOUNTER
----- Message from Olivia sent at 4/17/2025 10:32 AM CDT -----  Who Called: Ahmet JUAN KendalPatient is returning phone callWho Left Message for Patient:Geoffrey Garvin MADoes the patient know what this is regarding?:n/aPreferred Method of Contact: Phone CallPatient's Preferred Phone Number on File: [phone] Best Call Back Number, if different:Additional Information: Blood work for appointment on 4/60/25 for 8 week revisit.

## 2025-04-22 ENCOUNTER — TELEPHONE (OUTPATIENT)
Dept: INTERNAL MEDICINE | Facility: CLINIC | Age: 70
End: 2025-04-22
Payer: MEDICARE

## 2025-04-22 ENCOUNTER — LAB VISIT (OUTPATIENT)
Dept: LAB | Facility: HOSPITAL | Age: 70
End: 2025-04-22
Payer: MEDICARE

## 2025-04-22 DIAGNOSIS — M86.9 OSTEOMYELITIS OF OTHER SITE, UNSPECIFIED TYPE: ICD-10-CM

## 2025-04-22 DIAGNOSIS — R53.83 FATIGUE, UNSPECIFIED TYPE: ICD-10-CM

## 2025-04-22 LAB
ALBUMIN SERPL-MCNC: 4.1 G/DL (ref 3.4–4.8)
ALBUMIN/GLOB SERPL: 1.2 RATIO (ref 1.1–2)
ALP SERPL-CCNC: 84 UNIT/L (ref 40–150)
ALT SERPL-CCNC: 29 UNIT/L (ref 0–55)
ANION GAP SERPL CALC-SCNC: 9 MEQ/L
AST SERPL-CCNC: 25 UNIT/L (ref 11–45)
BILIRUB SERPL-MCNC: 0.7 MG/DL
BUN SERPL-MCNC: 19 MG/DL (ref 8.4–25.7)
CALCIUM SERPL-MCNC: 9.3 MG/DL (ref 8.8–10)
CHLORIDE SERPL-SCNC: 102 MMOL/L (ref 98–107)
CO2 SERPL-SCNC: 28 MMOL/L (ref 23–31)
CREAT SERPL-MCNC: 0.82 MG/DL (ref 0.72–1.25)
CREAT/UREA NIT SERPL: 23
CRP SERPL-MCNC: 1.4 MG/L
ERYTHROCYTE [SEDIMENTATION RATE] IN BLOOD: 7 MM/HR (ref 0–20)
GFR SERPLBLD CREATININE-BSD FMLA CKD-EPI: >60 ML/MIN/1.73/M2
GLOBULIN SER-MCNC: 3.3 GM/DL (ref 2.4–3.5)
GLUCOSE SERPL-MCNC: 90 MG/DL (ref 82–115)
POTASSIUM SERPL-SCNC: 4.6 MMOL/L (ref 3.5–5.1)
PROT SERPL-MCNC: 7.4 GM/DL (ref 5.8–7.6)
SODIUM SERPL-SCNC: 139 MMOL/L (ref 136–145)
TESTOST SERPL-MCNC: 405.85 NG/DL (ref 220.91–715.81)

## 2025-04-22 PROCEDURE — 80053 COMPREHEN METABOLIC PANEL: CPT

## 2025-04-22 PROCEDURE — 84403 ASSAY OF TOTAL TESTOSTERONE: CPT

## 2025-04-22 PROCEDURE — 86140 C-REACTIVE PROTEIN: CPT

## 2025-04-22 PROCEDURE — 36415 COLL VENOUS BLD VENIPUNCTURE: CPT

## 2025-04-22 PROCEDURE — 84402 ASSAY OF FREE TESTOSTERONE: CPT

## 2025-04-22 PROCEDURE — 85652 RBC SED RATE AUTOMATED: CPT

## 2025-04-22 NOTE — TELEPHONE ENCOUNTER
"----- Message from Nurse Vela sent at 4/22/2025  7:39 AM CDT -----  Regarding: Dr. De La Fuente 04/30/2025 8 week RV 2:40pm  Are there any outstanding tasks in chart? No, but needs FASTING labs, TO BE DONE AT  "[facility]" or lab location of choice PRIOR to apptIs there any documentation of tasks? noHas the pt seen another physician, been to ER, UCC, or admitted to hospital since last visit?Has the pt done blood work or imaging since last visit?5. PLEASE HAVE PATIENT BRING MEDICATION LIST OR BOTTLES TO EVERY OFFICE VISIT  "

## 2025-04-23 ENCOUNTER — OFFICE VISIT (OUTPATIENT)
Dept: INTERNAL MEDICINE | Facility: CLINIC | Age: 70
End: 2025-04-23
Payer: MEDICARE

## 2025-04-23 VITALS
DIASTOLIC BLOOD PRESSURE: 78 MMHG | SYSTOLIC BLOOD PRESSURE: 116 MMHG | HEART RATE: 75 BPM | OXYGEN SATURATION: 97 % | WEIGHT: 218 LBS | HEIGHT: 72 IN | BODY MASS INDEX: 29.53 KG/M2

## 2025-04-23 DIAGNOSIS — N39.41 URGE INCONTINENCE OF URINE: ICD-10-CM

## 2025-04-23 DIAGNOSIS — Z95.1 S/P CABG (CORONARY ARTERY BYPASS GRAFT): Primary | ICD-10-CM

## 2025-04-23 DIAGNOSIS — I48.0 PAROXYSMAL ATRIAL FIBRILLATION: ICD-10-CM

## 2025-04-23 DIAGNOSIS — E78.2 MIXED HYPERLIPIDEMIA: ICD-10-CM

## 2025-04-23 DIAGNOSIS — I10 ESSENTIAL HYPERTENSION: ICD-10-CM

## 2025-04-23 PROCEDURE — 1126F AMNT PAIN NOTED NONE PRSNT: CPT | Mod: CPTII,,, | Performed by: INTERNAL MEDICINE

## 2025-04-23 PROCEDURE — 1160F RVW MEDS BY RX/DR IN RCRD: CPT | Mod: CPTII,,, | Performed by: INTERNAL MEDICINE

## 2025-04-23 PROCEDURE — 1101F PT FALLS ASSESS-DOCD LE1/YR: CPT | Mod: CPTII,,, | Performed by: INTERNAL MEDICINE

## 2025-04-23 PROCEDURE — 3078F DIAST BP <80 MM HG: CPT | Mod: CPTII,,, | Performed by: INTERNAL MEDICINE

## 2025-04-23 PROCEDURE — 3008F BODY MASS INDEX DOCD: CPT | Mod: CPTII,,, | Performed by: INTERNAL MEDICINE

## 2025-04-23 PROCEDURE — 4010F ACE/ARB THERAPY RXD/TAKEN: CPT | Mod: CPTII,,, | Performed by: INTERNAL MEDICINE

## 2025-04-23 PROCEDURE — 1159F MED LIST DOCD IN RCRD: CPT | Mod: CPTII,,, | Performed by: INTERNAL MEDICINE

## 2025-04-23 PROCEDURE — 3074F SYST BP LT 130 MM HG: CPT | Mod: CPTII,,, | Performed by: INTERNAL MEDICINE

## 2025-04-23 PROCEDURE — 3044F HG A1C LEVEL LT 7.0%: CPT | Mod: CPTII,,, | Performed by: INTERNAL MEDICINE

## 2025-04-23 PROCEDURE — 3288F FALL RISK ASSESSMENT DOCD: CPT | Mod: CPTII,,, | Performed by: INTERNAL MEDICINE

## 2025-04-23 PROCEDURE — 99213 OFFICE O/P EST LOW 20 MIN: CPT | Mod: ,,, | Performed by: INTERNAL MEDICINE

## 2025-04-23 NOTE — PROGRESS NOTES
Patient ID: Ahmet Edmonds is a 69 y.o. male.  Chief Complaint: Follow-up (8 weeks)    HPI:   History of Present Illness           68 yo male  hx  CABG ,  post wound infection  on bactrim  po daily prophilactic to discuss blood work sed rate down to 7 CRP within normal limits, testosterone little low a prescription of Depo testosterone to be given 1/2 cc every 7-10 days   The rest of the blood were all within normal limits  Patient since episode of a TIA on Plavix advised now to change it to Monday Wednesday and Fridays.  Oxygen level 97 on room air patient has been exercising at the gym with no significant angina at all, patient clear to go scuba diving return to clinic in 4 months    Current Medications[1]  ROS:   Constitutional: No weight gain, No fever, No chills, No fatigue.   Eyes: No blurring, No visual disturbances.   Ear/Nose/Mouth/Throat: No decreased hearing, No ear pain, No nasal congestion, No sore throat.   Respiratory: No shortness of breath, No cough, No wheezing.   Cardiovascular: No chest pain, No palpitations, No peripheral edema.   Gastrointestinal: No nausea, No vomiting, No diarrhea, No constipation, No abdominal pain.   Genitourinary: No dysuria, No hematuria.   Hematology/Lymphatics: No bruising tendency, No bleeding tendency, No swollen lymph glands.   Endocrine: No excessive thirst, No polyuria, No excessive hunger.   Musculoskeletal: No joint pain, No muscle pain, No decreased range of motion.   Integumentary: No rash, No pruritus.   Neurologic: No abnormal balance, No confusion, No headache.   Psychiatric: No anxiety, No depression, Not suicidal, No hallucinations.    PE/Vitals:   /78 (BP Location: Left arm, Patient Position: Sitting)   Pulse 75   Ht 6' (1.829 m)   Wt 98.9 kg (218 lb)   SpO2 97%   BMI 29.57 kg/m²   General: Alert and oriented, No acute distress.   Eye: Normal conjunctiva without exudate.  HENMT: Normocephalic/AT, Normal hearing, Oral mucosa is moist and pink    Neck: No goiter visualized.   Respiratory: Lungs CTAB, Respirations are non-labored, Breath sounds are equal, Symmetrical chest wall expansion.  Cardiovascular: Normal rate, Regular rhythm, No murmur, No edema.   Gastrointestinal: Non-distended.   Genitourinary: Deferred.  Musculoskeletal: Normal ROM, Normal gait, No deformities or amputations.  Integumentary: Warm, Dry, Intact. No diaphoresis, or flushing.  Neurologic: No focal deficits, Cranial Nerves II-XII are grossly intact.   Psychiatric: Cooperative, Appropriate mood & affect, Normal judgment, Non-suicidal.  Assessment/Plan   Assessment & Plan             1. S/P CABG (coronary artery bypass graft)  Comments:  stable  no  angina    2. Paroxysmal atrial fibrillation  Comments:  rate controlled    3. Essential hypertension  Comments:  stable  on benazepril    4. Mixed hyperlipidemia  Comments:  on crestor    5. Urge incontinence of urine  Comments:  better on meds      No orders of the defined types were placed in this encounter.    Education and counseling done face to face regarding medical conditions and plan. Contact office if new symptoms develop. Should any symptoms ever significantly worsen seek emergency medical attention/go to ER. Follow up at least yearly for wellness or sooner PRN. Nurse to call patient with any results. The patient is receptive, expresses understanding and is agreeable to plan. All questions have been answered.  Follow up in about 4 months (around 8/23/2025).  This note was generated with the assistance of ambient listening technology. Verbal consent was obtained by the patient and accompanying visitor(s) for the recording of patient appointment to facilitate this note. I attest to having reviewed and edited the generated note for accuracy, though some syntax or spelling errors may persist. Please contact the author of this note for any clarification.            [1]   Current Outpatient Medications:     aspirin 81 MG Chew, 1 tablet  once., Disp: , Rfl:     benazepril-hydrochlorthiazide (LOTENSIN HCT) 20-12.5 mg per tablet, Take 0.5 tablets by mouth., Disp: , Rfl:     bisoprolol (ZEBETA) 5 MG tablet, Take 2.5 mg by mouth once daily., Disp: , Rfl:     clopidogreL (PLAVIX) 75 mg tablet, Take 1 tablet (75 mg total) by mouth once daily., Disp: 30 tablet, Rfl: 11    rosuvastatin (CRESTOR) 20 MG tablet, Take 20 mg by mouth every evening., Disp: , Rfl:     sulfamethoxazole-trimethoprim 800-160mg (BACTRIM DS) 800-160 mg Tab, Take 1 tablet by mouth once daily., Disp: 90 tablet, Rfl: 1

## 2025-04-25 LAB — TESTOST FREE SERPL-MCNC: 63 PG/ML

## 2025-05-01 ENCOUNTER — RESULTS FOLLOW-UP (OUTPATIENT)
Dept: INTERNAL MEDICINE | Facility: CLINIC | Age: 70
End: 2025-05-01

## 2025-05-08 ENCOUNTER — TELEPHONE (OUTPATIENT)
Dept: INTERNAL MEDICINE | Facility: CLINIC | Age: 70
End: 2025-05-08
Payer: MEDICARE

## 2025-05-08 NOTE — TELEPHONE ENCOUNTER
Copied from CRM #[id number]. Topic: General Inquiry - Return Call  >> May 8, 2025  4:35 PM Abel wrote:  Who Called: Ahmet Edmonds    Patient is returning phone call    Who Left Message for Patient:Mirian  Does the patient know what this is regarding?:n/a      Preferred Method of Contact: Phone Call  Patient's Preferred Phone Number on File: [phone]   Best Call Back Number, if different:  Additional Information: Patient states he has a missed call from clinic.

## 2025-05-08 NOTE — TELEPHONE ENCOUNTER
Copied from CRM #[id number]. Topic: General Inquiry - Patient Advice  >> May 8, 2025 10:33 AM Narendra wrote:  .Who Called: Ahmet Edmonds    Caller is requesting assistance/information from provider's office.    Symptoms (please be specific): na   How long has patient had these symptoms:  na  List of preferred pharmacies on file (remove unneeded): [unfilled]  If different, enter pharmacy into here including location and phone number: na      Preferred Method of Contact: Phone Call  Patient's Preferred Phone Number on File: [phone]   Best Call Back Number, if different:  Additional Information: pt wants Mirian to give him a call

## 2025-06-17 PROBLEM — L08.9 STERNAL WOUND INFECTION: Status: ACTIVE | Noted: 2025-06-17

## 2025-06-17 PROBLEM — Z78.9 HEALTH CARE HOME, ACTIVE CARE COORDINATION: Status: RESOLVED | Noted: 2023-11-17 | Resolved: 2025-06-17

## 2025-06-17 PROBLEM — S21.101A STERNAL WOUND INFECTION: Status: ACTIVE | Noted: 2025-06-17

## 2025-06-17 PROBLEM — Z79.2 CHRONIC ANTIBIOTIC SUPPRESSION: Status: ACTIVE | Noted: 2025-06-17

## 2025-06-17 PROBLEM — Z09 HOSPITAL DISCHARGE FOLLOW-UP: Status: RESOLVED | Noted: 2024-03-28 | Resolved: 2025-06-17

## 2025-06-17 PROBLEM — E87.6 HYPOKALEMIA: Status: RESOLVED | Noted: 2023-11-13 | Resolved: 2025-06-17

## 2025-06-26 ENCOUNTER — OFFICE VISIT (OUTPATIENT)
Dept: INFECTIOUS DISEASES | Facility: CLINIC | Age: 70
End: 2025-06-26
Payer: MEDICARE

## 2025-06-26 ENCOUNTER — LAB VISIT (OUTPATIENT)
Dept: LAB | Facility: HOSPITAL | Age: 70
End: 2025-06-26
Attending: INTERNAL MEDICINE
Payer: MEDICARE

## 2025-06-26 VITALS
DIASTOLIC BLOOD PRESSURE: 71 MMHG | RESPIRATION RATE: 16 BRPM | BODY MASS INDEX: 30.34 KG/M2 | SYSTOLIC BLOOD PRESSURE: 119 MMHG | WEIGHT: 224 LBS | HEART RATE: 71 BPM | TEMPERATURE: 99 F | HEIGHT: 72 IN

## 2025-06-26 DIAGNOSIS — M86.9 OSTEOMYELITIS OF OTHER SITE, UNSPECIFIED TYPE: Primary | ICD-10-CM

## 2025-06-26 DIAGNOSIS — Z79.899 HIGH RISK MEDICATION USE: ICD-10-CM

## 2025-06-26 DIAGNOSIS — M86.9 OSTEOMYELITIS OF OTHER SITE, UNSPECIFIED TYPE: ICD-10-CM

## 2025-06-26 DIAGNOSIS — A49.9 ESBL (EXTENDED SPECTRUM BETA-LACTAMASE) PRODUCING BACTERIA INFECTION: ICD-10-CM

## 2025-06-26 DIAGNOSIS — Z16.12 ESBL (EXTENDED SPECTRUM BETA-LACTAMASE) PRODUCING BACTERIA INFECTION: ICD-10-CM

## 2025-06-26 DIAGNOSIS — R53.83 FATIGUE, UNSPECIFIED TYPE: ICD-10-CM

## 2025-06-26 DIAGNOSIS — A49.8 KLEBSIELLA INFECTION: ICD-10-CM

## 2025-06-26 DIAGNOSIS — I25.810 CORONARY ARTERY DISEASE INVOLVING CORONARY BYPASS GRAFT OF NATIVE HEART WITHOUT ANGINA PECTORIS: ICD-10-CM

## 2025-06-26 LAB
ALBUMIN SERPL-MCNC: 3.7 G/DL (ref 3.4–4.8)
ALBUMIN/GLOB SERPL: 1.1 RATIO (ref 1.1–2)
ALP SERPL-CCNC: 76 UNIT/L (ref 40–150)
ALT SERPL-CCNC: 33 UNIT/L (ref 0–55)
ANION GAP SERPL CALC-SCNC: 6 MEQ/L
AST SERPL-CCNC: 25 UNIT/L (ref 11–45)
BASOPHILS # BLD AUTO: 0.05 X10(3)/MCL
BASOPHILS NFR BLD AUTO: 0.8 %
BILIRUB SERPL-MCNC: 0.7 MG/DL
BUN SERPL-MCNC: 18.9 MG/DL (ref 8.4–25.7)
CALCIUM SERPL-MCNC: 8.7 MG/DL (ref 8.8–10)
CHLORIDE SERPL-SCNC: 104 MMOL/L (ref 98–107)
CO2 SERPL-SCNC: 30 MMOL/L (ref 23–31)
CREAT SERPL-MCNC: 0.97 MG/DL (ref 0.72–1.25)
CREAT/UREA NIT SERPL: 19
CRP SERPL-MCNC: 1.3 MG/L
EOSINOPHIL # BLD AUTO: 0.1 X10(3)/MCL (ref 0–0.9)
EOSINOPHIL NFR BLD AUTO: 1.7 %
ERYTHROCYTE [DISTWIDTH] IN BLOOD BY AUTOMATED COUNT: 11.8 % (ref 11.5–17)
ERYTHROCYTE [SEDIMENTATION RATE] IN BLOOD: 3 MM/HR (ref 0–15)
GFR SERPLBLD CREATININE-BSD FMLA CKD-EPI: >60 ML/MIN/1.73/M2
GLOBULIN SER-MCNC: 3.3 GM/DL (ref 2.4–3.5)
GLUCOSE SERPL-MCNC: 95 MG/DL (ref 82–115)
HCT VFR BLD AUTO: 49.7 % (ref 42–52)
HGB BLD-MCNC: 16 G/DL (ref 14–18)
IMM GRANULOCYTES # BLD AUTO: 0.01 X10(3)/MCL (ref 0–0.04)
IMM GRANULOCYTES NFR BLD AUTO: 0.2 %
LYMPHOCYTES # BLD AUTO: 1.19 X10(3)/MCL (ref 0.6–4.6)
LYMPHOCYTES NFR BLD AUTO: 19.8 %
MCH RBC QN AUTO: 30.8 PG (ref 27–31)
MCHC RBC AUTO-ENTMCNC: 32.2 G/DL (ref 33–36)
MCV RBC AUTO: 95.6 FL (ref 80–94)
MONOCYTES # BLD AUTO: 0.54 X10(3)/MCL (ref 0.1–1.3)
MONOCYTES NFR BLD AUTO: 9 %
NEUTROPHILS # BLD AUTO: 4.12 X10(3)/MCL (ref 2.1–9.2)
NEUTROPHILS NFR BLD AUTO: 68.5 %
NRBC BLD AUTO-RTO: 0 %
PLATELET # BLD AUTO: 226 X10(3)/MCL (ref 130–400)
PMV BLD AUTO: 9.8 FL (ref 7.4–10.4)
POTASSIUM SERPL-SCNC: 4.7 MMOL/L (ref 3.5–5.1)
PROT SERPL-MCNC: 7 GM/DL (ref 5.8–7.6)
RBC # BLD AUTO: 5.2 X10(6)/MCL (ref 4.7–6.1)
SODIUM SERPL-SCNC: 140 MMOL/L (ref 136–145)
TESTOST SERPL-MCNC: 1249.56 NG/DL (ref 220.91–715.81)
WBC # BLD AUTO: 6.01 X10(3)/MCL (ref 4.5–11.5)

## 2025-06-26 PROCEDURE — 85652 RBC SED RATE AUTOMATED: CPT

## 2025-06-26 PROCEDURE — 80053 COMPREHEN METABOLIC PANEL: CPT

## 2025-06-26 PROCEDURE — 86140 C-REACTIVE PROTEIN: CPT

## 2025-06-26 PROCEDURE — 99213 OFFICE O/P EST LOW 20 MIN: CPT | Mod: PBBFAC

## 2025-06-26 PROCEDURE — 36415 COLL VENOUS BLD VENIPUNCTURE: CPT

## 2025-06-26 PROCEDURE — 85025 COMPLETE CBC W/AUTO DIFF WBC: CPT

## 2025-06-26 PROCEDURE — 84403 ASSAY OF TOTAL TESTOSTERONE: CPT

## 2025-06-26 RX ORDER — PREDNISONE 10 MG/1
10 TABLET ORAL
COMMUNITY
Start: 2025-03-28

## 2025-06-26 RX ORDER — TESTOSTERONE CYPIONATE 200 MG/ML
INJECTION, SOLUTION INTRAMUSCULAR
COMMUNITY
Start: 2025-05-14

## 2025-06-26 RX ORDER — ALFUZOSIN HYDROCHLORIDE 10 MG/1
10 TABLET, EXTENDED RELEASE ORAL
COMMUNITY
Start: 2025-06-11

## 2025-06-26 RX ORDER — SULFAMETHOXAZOLE AND TRIMETHOPRIM 800; 160 MG/1; MG/1
1 TABLET ORAL
Qty: 39 TABLET | Refills: 0 | Status: SHIPPED | OUTPATIENT
Start: 2025-06-27 | End: 2025-09-26

## 2025-06-26 NOTE — PROGRESS NOTES
[facility]patient INFECTIOUS DISEASES Clinic Note    CHIEF COMPLAINT: Referral for sternal osteomyelitis    HISTORY OF PRESENT ILLNESS:     69-year-old male with PMH CAD s/p CABG 10/24/23 complicated by sternal wound dehiscence with Klebsiella aerogenes infection, prostate cancer, HTN who presents to Infectious Disease Clinic today for his initial visit.  He was previously followed by Dr. Flynn for management of his recurrent sternal infection and is currently on Bactrim.    After undergoing his CABG surgery in 10/2023 he developed partial sternal incision the dehiscence which prompted a readmission.  On 11/04/2023 he underwent removal of sternal hardware with sternal rewiring and fixation with plating system.  After this operation his wound dehisced with purulence; cultures obtained showed growth of Klebsiella aerogenes.  He returned to the OR where he underwent removal of wires.  He was treated with ceftriaxone for 4 weeks.  He established are with Dr. Coronado prior to the end date of ceftriaxone and was started on ciprofloxacin due to presence of hardware.  His wound eventually healed until 03/2024 where a blister developed that ruptured; cultures again obtained showed growth of Klebsiella aerogenes.  His primary care physician placed him on topical gentamicin as organism now showed ESBL resistance.  Eventually he was started on meropenem and completed a 6 week course which ended on 05/02/2024.  He was monitored off antibiotics for about 4 months however eventually he had return of a blister over his distal incision area and drainage cultured showed  klebseilla aerogenes.  CT chest completed 09/13/2024 showed suspected osteomyelitis of the left 8th rib along with persistent retrosternal fluid collection.  He was started on Bactrim in 09/2024 (based off organism sensitivities) and has remained on this drug since this time.       Visit 6/26/25:  Today, patient reports no complaints. Compliant with Bactrim daily,  with no side affects. Denies, sternal wound, pain, discharge,erythema, swelling, blisters. Reports has been taking OTC ivermectin and fenbendazole, for prostate cancer, educated patient on no clear clinical evidence suggesting its role in treating prostate cancer.    REVIEW OF SYSTEMS:  Review of Systems   Constitutional:  Negative for chills, fever, malaise/fatigue and weight loss.   HENT:  Negative for congestion and sore throat.    Eyes:  Negative for blurred vision.   Respiratory:  Negative for cough, sputum production and shortness of breath.    Cardiovascular:  Negative for chest pain, palpitations and leg swelling.   Gastrointestinal:  Negative for abdominal pain, diarrhea, nausea and vomiting.   Genitourinary:  Negative for dysuria.   Musculoskeletal:  Negative for joint pain.   Skin:  Negative for rash.   Neurological:  Negative for focal weakness, weakness and headaches.       PREVIOUS MEDICAL HISTORY:  Past Medical History:   Diagnosis Date    A-fib     Hypertension        PREVIOUS SURGICAL HISTORY:  Past Surgical History:   Procedure Laterality Date    CARDIAC SURGERY      INCISION AND DRAINAGE OF CHEST Left     KNEE ARTHROSCOPY Left          ALLERGIES:  Review of patient's allergies indicates:  No Known Allergies      MEDICATIONS:  Current Outpatient Medications on File Prior to Visit   Medication Sig Dispense Refill    aspirin 81 MG Chew 1 tablet once.      benazepril-hydrochlorthiazide (LOTENSIN HCT) 20-12.5 mg per tablet Take 0.5 tablets by mouth.      bisoprolol (ZEBETA) 5 MG tablet Take 2.5 mg by mouth once daily.      clopidogreL (PLAVIX) 75 mg tablet Take 1 tablet (75 mg total) by mouth once daily. 30 tablet 11    rosuvastatin (CRESTOR) 20 MG tablet Take 20 mg by mouth every evening.      sulfamethoxazole-trimethoprim 800-160mg (BACTRIM DS) 800-160 mg Tab Take 1 tablet by mouth once daily. 90 tablet 1     No current facility-administered medications on file prior to visit.          SOCIAL  HISTORY:    reports that he has never smoked. He has never used smokeless tobacco. He reports current alcohol use. He reports that he does not use drugs.      FAMILY HISTORY:   Family History   Problem Relation Name Age of Onset    Heart disease Father Jacob     Hypertension Father Jacob        PHYSICAL EXAMINATION:  There were no vitals taken for this visit. There is no height or weight on file to calculate BMI.    Gen: awake, alert, NAD  HEENT: NC/AT, EOMi, anicteric sclera, no rhinorrhea, OP clear; tenderness to palpation noted to the left temporal area  Neck: no cervical LAD  CV: regular rate normal rhythm no murmur  Resp: easy WOB on RA CTABL no w/r/r  Abd: +BS, soft, NTTP, no HSM  Ext: warm, no LE edema  Skin: Well healed incision scar over his mid chest. No open wounds visible.   Neuro: no focal deficits       LABORATORY DATA:  Lab Results   Component Value Date    WBC 6.63 02/23/2025    WBC 5.75 02/22/2025    WBC 5.44 01/31/2025    HGB 15.4 02/23/2025    HGB 16.2 02/22/2025    HGB 15.9 01/31/2025     02/23/2025     02/22/2025     01/31/2025    CREATININE 0.82 04/22/2025    CREATININE 0.89 02/23/2025    CREATININE 1.15 02/22/2025    ALT 29 04/22/2025    ALT 25 02/23/2025    ALT 35 02/22/2025    AST 25 04/22/2025    AST 19 02/23/2025    AST 22 02/22/2025    ALKPHOS 84 04/22/2025    ALKPHOS 76 02/23/2025    ALKPHOS 77 02/22/2025    BILITOT 0.7 04/22/2025    BILITOT 0.6 02/23/2025    BILITOT 0.4 02/22/2025    INR 1.0 02/22/2025    INR 1.1 11/14/2023    INR 1.3 11/08/2023       MICROBIOLOGY DATA:    3/14/24 Chest fluid exudate cx: ESBL Klebsiella aerogenes      8/30/24 L chest fluid exudate cx: ESBL Klebsiella aerogenes    IMAGING DATA:    9/13/24 CT chest w/ IV  Impression:     Persistent retrosternal fluid collection without loculated abscess formation.     Suspected osteomyelitis of the proximal left 8th rib.  With associated soft tissue edema extending anteriorly and  posteriorly    ASSESSMENT:    69-year-old male with PMH CAD s/p CABG 10/24/23 complicated by sternal wound dehiscence with Klebsiella aerogenes infection, prostate cancer, HTN who presents to Infectious Disease Clinic today for his initial visit.  He was previously followed by Dr. Flynn for management of his recurrent sternal infection and is currently on Bactrim.    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 It seems this may have been used for chronic suppression purposes after his treatment for L 8th rib OM. Last CT chest completed showed aforementioned OM as well as fluid collection. He will need to have repeated imaging of this area, however as he has an upcoming PET CT (scheduled prior to his visit with urology for prostate cancer management) it is reasonable to follow up the results of this test.       Checking labs as well to monitor renal indices while on bactrim.      PLAN:  -reduction to Bactrim DS to 3 times weekly(MWF) for 3 months with decision of continue suppressive therapy at three-month intervals versus cessation of Bactrim at follow up visit  - ordered CMP, ESR, CBC and CRP today    RTC 3 months    Ketan Morrell MD  Internal Medicine - PGY-1

## 2025-06-30 ENCOUNTER — TELEPHONE (OUTPATIENT)
Dept: INTERNAL MEDICINE | Facility: CLINIC | Age: 70
End: 2025-06-30
Payer: MEDICARE

## 2025-06-30 NOTE — TELEPHONE ENCOUNTER
Copied from CRM #[id number]. Topic: General Inquiry - Patient Advice  >> Jun 30, 2025 12:24 PM Olivia wrote:  Who Called: Ahmet Edmonds    Caller is requesting assistance/information from provider's office.    Symptoms (please be specific): n/a   How long has patient had these symptoms:  n/a  List of preferred pharmacies on file (remove unneeded): n/a      Preferred Method of Contact: Phone Call  Patient's Preferred Phone Number on File: [phone]   Best Call Back Number, if different:  Additional Information: Pt requesting call from nurse, no more info given.

## 2025-06-30 NOTE — TELEPHONE ENCOUNTER
Patient had labs done with Infectious Disease Physician and would like Dr. De La Fuente to review as well. He indicated his Bactrim Rx was instructed to take 3 days per week.  Concerned with out of range readings     He also has concerns about his Testosterone Level that was drawn 06/26/25.  He was originally doing Testosterone every 7-10 days, now believes he should do it every 10-12 days but wants Dr. De La Fuente's recommendations.

## 2025-07-07 ENCOUNTER — TELEPHONE (OUTPATIENT)
Dept: INTERNAL MEDICINE | Facility: CLINIC | Age: 70
End: 2025-07-07
Payer: MEDICARE

## 2025-07-07 NOTE — TELEPHONE ENCOUNTER
Copied from CRM #[id number]. Topic: General Inquiry - Patient Advice  >> Jul 7, 2025 11:48 AM Jose wrote:  Who Called: Ahmet Edmonds    Caller is requesting assistance/information from provider's office.    Symptoms (please be specific):    How long has patient had these symptoms:    List of preferred pharmacies on file (remove unneeded): [unfilled]  If different, enter pharmacy into here including location and phone number:       Preferred Method of Contact: Phone Call  Patient's Preferred Phone Number on File: [phone]   Best Call Back Number, if different:  Additional Information: Pt requesting call back from Mirian laguerre

## 2025-07-28 ENCOUNTER — TELEPHONE (OUTPATIENT)
Dept: INTERNAL MEDICINE | Facility: CLINIC | Age: 70
End: 2025-07-28
Payer: MEDICARE

## 2025-07-28 NOTE — TELEPHONE ENCOUNTER
----- Message from Nurse Vela sent at 7/24/2025  7:44 AM CDT -----  Regarding: Dr. De La Fuente 08/04/2025 4 [facility] 1:00pm  Are there any outstanding tasks in chart? No    Is there any documentation of tasks? No    Has the pt seen another physician, been to ER, UCC, or admitted to hospital since last visit?    Has the pt done blood work or imaging since last visit?     5. PLEASE HAVE PATIENT BRING MEDICATION LIST OR BOTTLES TO EVERY OFFICE VISIT

## 2025-08-04 ENCOUNTER — OFFICE VISIT (OUTPATIENT)
Dept: INTERNAL MEDICINE | Facility: CLINIC | Age: 70
End: 2025-08-04
Payer: MEDICARE

## 2025-08-04 VITALS
WEIGHT: 230 LBS | OXYGEN SATURATION: 97 % | SYSTOLIC BLOOD PRESSURE: 118 MMHG | BODY MASS INDEX: 31.15 KG/M2 | HEART RATE: 81 BPM | DIASTOLIC BLOOD PRESSURE: 70 MMHG | HEIGHT: 72 IN

## 2025-08-04 DIAGNOSIS — G47.00 INSOMNIA, UNSPECIFIED TYPE: Primary | ICD-10-CM

## 2025-08-04 DIAGNOSIS — R97.20 ELEVATED PSA: ICD-10-CM

## 2025-08-04 PROBLEM — S21.101A STERNAL WOUND INFECTION: Status: RESOLVED | Noted: 2025-06-17 | Resolved: 2025-08-04

## 2025-08-04 PROBLEM — L08.9 STERNAL WOUND INFECTION: Status: RESOLVED | Noted: 2025-06-17 | Resolved: 2025-08-04

## 2025-08-04 PROCEDURE — 3044F HG A1C LEVEL LT 7.0%: CPT | Mod: CPTII,,, | Performed by: INTERNAL MEDICINE

## 2025-08-04 PROCEDURE — 99214 OFFICE O/P EST MOD 30 MIN: CPT | Mod: ,,, | Performed by: INTERNAL MEDICINE

## 2025-08-04 PROCEDURE — 3008F BODY MASS INDEX DOCD: CPT | Mod: CPTII,,, | Performed by: INTERNAL MEDICINE

## 2025-08-04 PROCEDURE — 3078F DIAST BP <80 MM HG: CPT | Mod: CPTII,,, | Performed by: INTERNAL MEDICINE

## 2025-08-04 PROCEDURE — 3074F SYST BP LT 130 MM HG: CPT | Mod: CPTII,,, | Performed by: INTERNAL MEDICINE

## 2025-08-04 PROCEDURE — 1159F MED LIST DOCD IN RCRD: CPT | Mod: CPTII,,, | Performed by: INTERNAL MEDICINE

## 2025-08-04 PROCEDURE — 3288F FALL RISK ASSESSMENT DOCD: CPT | Mod: CPTII,,, | Performed by: INTERNAL MEDICINE

## 2025-08-04 PROCEDURE — 1160F RVW MEDS BY RX/DR IN RCRD: CPT | Mod: CPTII,,, | Performed by: INTERNAL MEDICINE

## 2025-08-04 PROCEDURE — 1101F PT FALLS ASSESS-DOCD LE1/YR: CPT | Mod: CPTII,,, | Performed by: INTERNAL MEDICINE

## 2025-08-04 PROCEDURE — 1126F AMNT PAIN NOTED NONE PRSNT: CPT | Mod: CPTII,,, | Performed by: INTERNAL MEDICINE

## 2025-08-04 PROCEDURE — 4010F ACE/ARB THERAPY RXD/TAKEN: CPT | Mod: CPTII,,, | Performed by: INTERNAL MEDICINE

## 2025-08-04 RX ORDER — TADALAFIL 5 MG/1
5 TABLET ORAL DAILY PRN
Qty: 30 TABLET | Refills: 8 | Status: SHIPPED | OUTPATIENT
Start: 2025-08-04 | End: 2026-08-04

## 2025-08-04 NOTE — PROGRESS NOTES
Patient ID: Ahmet Edmonds is a 70 y.o. male.  Chief Complaint: Follow-up (4 mth /Ankles swollen after being up on his feet all weekend due to being a fishing tournament )    HPI:   History of Present Illness             71 yo male  s/p CABG , HBP   infected chest wall   after  CABG.  Closely follow up by  ID    Here to discuss blood work.   All reviewed   ALL Ok  .  Lpa  normal ,  Hga1c  5.6     Patient  inform to start taking  B complex, ashwaganda  for sleep     Current Medications[1]  ROS:   Constitutional: No weight gain, No fever, No chills, No fatigue.   Eyes: No blurring, No visual disturbances.   Ear/Nose/Mouth/Throat: No decreased hearing, No ear pain, No nasal congestion, No sore throat.   Respiratory: No shortness of breath, No cough, No wheezing.   Cardiovascular: No chest pain, No palpitations, No peripheral edema.   Gastrointestinal: No nausea, No vomiting, No diarrhea, No constipation, No abdominal pain.   Genitourinary: No dysuria, No hematuria.   Hematology/Lymphatics: No bruising tendency, No bleeding tendency, No swollen lymph glands.   Endocrine: No excessive thirst, No polyuria, No excessive hunger.   Musculoskeletal: No joint pain, No muscle pain, No decreased range of motion.   Integumentary: No rash, No pruritus.  Ankle edema   Neurologic: No abnormal balance, No confusion, No headache.   Psychiatric: No anxiety, No depression, Not suicidal, No hallucinations.    PE/Vitals:   /70 (BP Location: Left arm, Patient Position: Sitting)   Pulse 81   Ht 6' (1.829 m)   Wt 104.3 kg (230 lb)   SpO2 97%   BMI 31.19 kg/m²   General: Alert and oriented, No acute distress.   Eye: Normal conjunctiva without exudate.  HENMT: Normocephalic/AT, Normal hearing, Oral mucosa is moist and pink   Neck: No goiter visualized.   Respiratory: Lungs CTAB, Respirations are non-labored, Breath sounds are equal, Symmetrical chest wall expansion.  Cardiovascular: Normal rate, Regular rhythm, No murmur, No  edema.   Gastrointestinal: Non-distended.   Genitourinary: Deferred.  Musculoskeletal: Normal ROM, Normal gait, No deformities or amputations.  Integumentary: Warm, Dry, Intact. No diaphoresis, or flushing.  Neurologic: No focal deficits, Cranial Nerves II-XII are grossly intact.   Psychiatric: Cooperative, Appropriate mood & affect, Normal judgment, Non-suicidal.  Assessment/Plan   Assessment & Plan             1. Insomnia, unspecified type    2. Elevated PSA  -     tadalafiL (CIALIS) 5 MG tablet; Take 1 tablet (5 mg total) by mouth daily as needed for Erectile Dysfunction.  Dispense: 30 tablet; Refill: 8      No orders of the defined types were placed in this encounter.    Education and counseling done face to face regarding medical conditions and plan. Contact office if new symptoms develop. Should any symptoms ever significantly worsen seek emergency medical attention/go to ER. Follow up at least yearly for wellness or sooner PRN. Nurse to call patient with any results. The patient is receptive, expresses understanding and is agreeable to plan. All questions have been answered.  Follow up in about 6 months (around 2/4/2026).  This note was generated with the assistance of ambient listening technology. Verbal consent was obtained by the patient and accompanying visitor(s) for the recording of patient appointment to facilitate this note. I attest to having reviewed and edited the generated note for accuracy, though some syntax or spelling errors may persist. Please contact the author of this note for any clarification.            [1]   Current Outpatient Medications:     alfuzosin (UROXATRAL) 10 mg Tb24, Take 10 mg by mouth daily with breakfast., Disp: , Rfl:     aspirin 81 MG Chew, 1 tablet once., Disp: , Rfl:     benazepril-hydrochlorthiazide (LOTENSIN HCT) 20-12.5 mg per tablet, Take 0.5 tablets by mouth., Disp: , Rfl:     bisoprolol (ZEBETA) 5 MG tablet, Take 2.5 mg by mouth once daily., Disp: , Rfl:      clopidogreL (PLAVIX) 75 mg tablet, Take 1 tablet (75 mg total) by mouth once daily., Disp: 30 tablet, Rfl: 11    predniSONE (DELTASONE) 10 MG tablet, Take 10 mg by mouth 3 (three) times a week., Disp: , Rfl:     rosuvastatin (CRESTOR) 20 MG tablet, Take 20 mg by mouth every evening., Disp: , Rfl:     sulfamethoxazole-trimethoprim 800-160mg (BACTRIM DS) 800-160 mg Tab, Take 1 tablet by mouth 3 (three) times a week., Disp: 39 tablet, Rfl: 0    testosterone cypionate (DEPOTESTOTERONE CYPIONATE) 200 mg/mL injection, every 10 days., Disp: , Rfl:     tadalafiL (CIALIS) 5 MG tablet, Take 1 tablet (5 mg total) by mouth daily as needed for Erectile Dysfunction., Disp: 30 tablet, Rfl: 8

## 2025-08-05 ENCOUNTER — TELEPHONE (OUTPATIENT)
Dept: INTERNAL MEDICINE | Facility: CLINIC | Age: 70
End: 2025-08-05
Payer: MEDICARE

## 2025-08-05 NOTE — TELEPHONE ENCOUNTER
Copied from CRM #[id number]. Topic: General Inquiry - Patient Advice  >> Aug 5, 2025  3:30 PM Jesi wrote:  .Who Called: Ahmet Edmonds    Patient is returning phone call    Who Left Message for Patient:  Does the patient know what this is regarding?:Pt requesting callback from office due to tadalafiL (CIALIS) 5 MG tablet needing PA. Pls advise   Pharmacy: [facility] [address] [place] [address]       Preferred Method of Contact: Phone Call  Patient's Preferred Phone Number on File: [phone]   Best Call Back Number, if different:  Additional Information: